# Patient Record
Sex: FEMALE | Race: WHITE | HISPANIC OR LATINO | Employment: FULL TIME | URBAN - METROPOLITAN AREA
[De-identification: names, ages, dates, MRNs, and addresses within clinical notes are randomized per-mention and may not be internally consistent; named-entity substitution may affect disease eponyms.]

---

## 2024-09-11 ENCOUNTER — PHARMACY VISIT (OUTPATIENT)
Dept: PHARMACY | Facility: MEDICAL CENTER | Age: 44
End: 2024-09-11
Payer: COMMERCIAL

## 2024-09-11 ENCOUNTER — HOSPITAL ENCOUNTER (EMERGENCY)
Facility: MEDICAL CENTER | Age: 44
End: 2024-09-11
Attending: EMERGENCY MEDICINE

## 2024-09-11 ENCOUNTER — APPOINTMENT (OUTPATIENT)
Dept: RADIOLOGY | Facility: MEDICAL CENTER | Age: 44
End: 2024-09-11
Attending: EMERGENCY MEDICINE

## 2024-09-11 VITALS
HEART RATE: 78 BPM | RESPIRATION RATE: 16 BRPM | BODY MASS INDEX: 28.22 KG/M2 | SYSTOLIC BLOOD PRESSURE: 152 MMHG | HEIGHT: 59 IN | TEMPERATURE: 98.1 F | OXYGEN SATURATION: 98 % | WEIGHT: 140 LBS | DIASTOLIC BLOOD PRESSURE: 92 MMHG

## 2024-09-11 DIAGNOSIS — K80.20 CALCULUS OF GALLBLADDER WITHOUT CHOLECYSTITIS WITHOUT OBSTRUCTION: ICD-10-CM

## 2024-09-11 LAB
ALBUMIN SERPL BCP-MCNC: 4.7 G/DL (ref 3.2–4.9)
ALBUMIN/GLOB SERPL: 1.2 G/DL
ALP SERPL-CCNC: 96 U/L (ref 30–99)
ALT SERPL-CCNC: 12 U/L (ref 2–50)
ANION GAP SERPL CALC-SCNC: 13 MMOL/L (ref 7–16)
APPEARANCE UR: CLEAR
AST SERPL-CCNC: 26 U/L (ref 12–45)
BACTERIA #/AREA URNS HPF: ABNORMAL /HPF
BASOPHILS # BLD AUTO: 0.3 % (ref 0–1.8)
BASOPHILS # BLD: 0.02 K/UL (ref 0–0.12)
BILIRUB SERPL-MCNC: 0.2 MG/DL (ref 0.1–1.5)
BILIRUB UR QL STRIP.AUTO: NEGATIVE
BUN SERPL-MCNC: 13 MG/DL (ref 8–22)
CALCIUM ALBUM COR SERPL-MCNC: 8.7 MG/DL (ref 8.5–10.5)
CALCIUM SERPL-MCNC: 9.3 MG/DL (ref 8.5–10.5)
CHLORIDE SERPL-SCNC: 103 MMOL/L (ref 96–112)
CO2 SERPL-SCNC: 26 MMOL/L (ref 20–33)
COLOR UR: YELLOW
CREAT SERPL-MCNC: 0.72 MG/DL (ref 0.5–1.4)
EKG IMPRESSION: NORMAL
EOSINOPHIL # BLD AUTO: 0.02 K/UL (ref 0–0.51)
EOSINOPHIL NFR BLD: 0.3 % (ref 0–6.9)
EPI CELLS #/AREA URNS HPF: ABNORMAL /HPF
ERYTHROCYTE [DISTWIDTH] IN BLOOD BY AUTOMATED COUNT: 41.1 FL (ref 35.9–50)
GFR SERPLBLD CREATININE-BSD FMLA CKD-EPI: 106 ML/MIN/1.73 M 2
GLOBULIN SER CALC-MCNC: 3.9 G/DL (ref 1.9–3.5)
GLUCOSE SERPL-MCNC: 136 MG/DL (ref 65–99)
GLUCOSE UR STRIP.AUTO-MCNC: NEGATIVE MG/DL
HCG SERPL QL: NEGATIVE
HCT VFR BLD AUTO: 47 % (ref 37–47)
HGB BLD-MCNC: 15.3 G/DL (ref 12–16)
HYALINE CASTS #/AREA URNS LPF: ABNORMAL /LPF
IMM GRANULOCYTES # BLD AUTO: 0.01 K/UL (ref 0–0.11)
IMM GRANULOCYTES NFR BLD AUTO: 0.1 % (ref 0–0.9)
KETONES UR STRIP.AUTO-MCNC: NEGATIVE MG/DL
LEUKOCYTE ESTERASE UR QL STRIP.AUTO: ABNORMAL
LIPASE SERPL-CCNC: 23 U/L (ref 11–82)
LYMPHOCYTES # BLD AUTO: 0.89 K/UL (ref 1–4.8)
LYMPHOCYTES NFR BLD: 11.8 % (ref 22–41)
MCH RBC QN AUTO: 27.8 PG (ref 27–33)
MCHC RBC AUTO-ENTMCNC: 32.6 G/DL (ref 32.2–35.5)
MCV RBC AUTO: 85.5 FL (ref 81.4–97.8)
MICRO URNS: ABNORMAL
MONOCYTES # BLD AUTO: 0.11 K/UL (ref 0–0.85)
MONOCYTES NFR BLD AUTO: 1.5 % (ref 0–13.4)
NEUTROPHILS # BLD AUTO: 6.47 K/UL (ref 1.82–7.42)
NEUTROPHILS NFR BLD: 86 % (ref 44–72)
NITRITE UR QL STRIP.AUTO: POSITIVE
NRBC # BLD AUTO: 0 K/UL
NRBC BLD-RTO: 0 /100 WBC (ref 0–0.2)
PH UR STRIP.AUTO: 7.5 [PH] (ref 5–8)
PLATELET # BLD AUTO: 315 K/UL (ref 164–446)
PMV BLD AUTO: 11 FL (ref 9–12.9)
POTASSIUM SERPL-SCNC: 4.3 MMOL/L (ref 3.6–5.5)
PROT SERPL-MCNC: 8.6 G/DL (ref 6–8.2)
PROT UR QL STRIP: 30 MG/DL
RBC # BLD AUTO: 5.5 M/UL (ref 4.2–5.4)
RBC # URNS HPF: ABNORMAL /HPF
RBC UR QL AUTO: NEGATIVE
SODIUM SERPL-SCNC: 142 MMOL/L (ref 135–145)
SP GR UR STRIP.AUTO: 1.02
UROBILINOGEN UR STRIP.AUTO-MCNC: 0.2 MG/DL
WBC # BLD AUTO: 7.5 K/UL (ref 4.8–10.8)
WBC #/AREA URNS HPF: ABNORMAL /HPF

## 2024-09-11 PROCEDURE — 700105 HCHG RX REV CODE 258: Performed by: EMERGENCY MEDICINE

## 2024-09-11 PROCEDURE — 36415 COLL VENOUS BLD VENIPUNCTURE: CPT

## 2024-09-11 PROCEDURE — 80053 COMPREHEN METABOLIC PANEL: CPT

## 2024-09-11 PROCEDURE — 93005 ELECTROCARDIOGRAM TRACING: CPT | Performed by: EMERGENCY MEDICINE

## 2024-09-11 PROCEDURE — 76705 ECHO EXAM OF ABDOMEN: CPT

## 2024-09-11 PROCEDURE — 85025 COMPLETE CBC W/AUTO DIFF WBC: CPT

## 2024-09-11 PROCEDURE — 83690 ASSAY OF LIPASE: CPT

## 2024-09-11 PROCEDURE — RXMED WILLOW AMBULATORY MEDICATION CHARGE: Performed by: EMERGENCY MEDICINE

## 2024-09-11 PROCEDURE — 96374 THER/PROPH/DIAG INJ IV PUSH: CPT

## 2024-09-11 PROCEDURE — 93005 ELECTROCARDIOGRAM TRACING: CPT

## 2024-09-11 PROCEDURE — 96375 TX/PRO/DX INJ NEW DRUG ADDON: CPT

## 2024-09-11 PROCEDURE — 84703 CHORIONIC GONADOTROPIN ASSAY: CPT

## 2024-09-11 PROCEDURE — 700111 HCHG RX REV CODE 636 W/ 250 OVERRIDE (IP): Mod: JZ | Performed by: EMERGENCY MEDICINE

## 2024-09-11 PROCEDURE — 81001 URINALYSIS AUTO W/SCOPE: CPT

## 2024-09-11 PROCEDURE — 99285 EMERGENCY DEPT VISIT HI MDM: CPT

## 2024-09-11 RX ORDER — ONDANSETRON 2 MG/ML
4 INJECTION INTRAMUSCULAR; INTRAVENOUS ONCE
Status: COMPLETED | OUTPATIENT
Start: 2024-09-11 | End: 2024-09-11

## 2024-09-11 RX ORDER — MORPHINE SULFATE 4 MG/ML
4 INJECTION INTRAVENOUS ONCE
Status: COMPLETED | OUTPATIENT
Start: 2024-09-11 | End: 2024-09-11

## 2024-09-11 RX ORDER — ONDANSETRON 4 MG/1
4 TABLET, ORALLY DISINTEGRATING ORAL EVERY 6 HOURS PRN
Qty: 10 TABLET | Refills: 0 | Status: SHIPPED | OUTPATIENT
Start: 2024-09-11

## 2024-09-11 RX ORDER — SODIUM CHLORIDE 9 MG/ML
1000 INJECTION, SOLUTION INTRAVENOUS ONCE
Status: COMPLETED | OUTPATIENT
Start: 2024-09-11 | End: 2024-09-11

## 2024-09-11 RX ORDER — OXYCODONE HYDROCHLORIDE 5 MG/1
5 TABLET ORAL EVERY 4 HOURS PRN
Qty: 12 TABLET | Refills: 0 | Status: SHIPPED | OUTPATIENT
Start: 2024-09-11 | End: 2024-09-16

## 2024-09-11 RX ADMIN — MORPHINE SULFATE 4 MG: 4 INJECTION, SOLUTION INTRAMUSCULAR; INTRAVENOUS at 14:56

## 2024-09-11 RX ADMIN — SODIUM CHLORIDE 1000 ML: 9 INJECTION, SOLUTION INTRAVENOUS at 14:55

## 2024-09-11 RX ADMIN — ONDANSETRON 4 MG: 2 INJECTION INTRAMUSCULAR; INTRAVENOUS at 14:56

## 2024-09-11 ASSESSMENT — LIFESTYLE VARIABLES: DO YOU DRINK ALCOHOL: NO

## 2024-09-11 NOTE — ED TRIAGE NOTES
Chief Complaint   Patient presents with    Abdominal Pain     Generalized abdominal pain x8 hours. Intermittent 8/10 pain. Hx of gallbladder stones.     N/V     X1 day. Denies blood in emesis.       Patient wheeled to triage for above complaint. Patient A&Ox4, GCS 15, patient speaking in full sentences. Equal and unlabored respirations. Patient educated on triage process and encouraged to notify staff if condition worsens. Appropriate protocols ordered. Patient returned to the lobby in stable condition.

## 2024-09-11 NOTE — ED PROVIDER NOTES
"ED Provider Note    CHIEF COMPLAINT  Chief Complaint   Patient presents with    Abdominal Pain     Generalized abdominal pain x8 hours. Intermittent 8/10 pain. Hx of gallbladder stones.     N/V     X1 day. Denies blood in emesis.      EXTERNAL RECORDS REVIEWED  Reviewed, none available.     HPI/ROS  LIMITATION TO HISTORY   Select: : None  OUTSIDE HISTORIAN(S):  Family at bedside.    Katina Rider is a 43 y.o. female who presents to the Emergency Department with abdominal pain onset earlier today. She locates the pain across her upper abdomen. The patient reports associated nausea and vomiting. She has a history of two gall stones. The patient has had intermittent symptoms for the past two years but this episode has been worse and longer lasting than normal. She denies any difficulty with voiding. She has a history of cervical cancer that was excised in [facility] in 2018.     PAST MEDICAL HISTORY  History reviewed. No pertinent past medical history.     SURGICAL HISTORY  History reviewed. No pertinent surgical history.     FAMILY HISTORY  History reviewed. No pertinent family history.    SOCIAL HISTORY   reports that she has never smoked. She has never used smokeless tobacco. She reports that she does not drink alcohol and does not use drugs.    CURRENT MEDICATIONS  Discharge Medication List as of 9/11/2024  4:55 PM          ALLERGIES  Patient has no known allergies.    PHYSICAL EXAM  BP (!) 145/106   Pulse 85   Resp 16   Ht 1.5 m (4' 11.06\")   Wt 63.5 kg (140 lb)   SpO2 100%      Constitutional: Nontoxic appearing. Alert in no apparent distress.  HENT: Normocephalic, Atraumatic. Bilateral external ears normal. Nose normal.  Moist mucous membranes.  Oropharynx clear.  Eyes: Pupils are equal and reactive. Conjunctiva normal.   Neck: Supple, full range of motion  Heart: Regular rate and rhythm.  No murmurs.    Lungs: No respiratory distress, normal work of breathing. Lungs clear to auscultation " bilaterally.  Abdomen Soft, no distention.  Diffuse upper abdominal tenderness, negative Arguelles's sign    Musculoskeletal: Atraumatic. No obvious deformities noted.  No lower extremity edema.  Skin: Warm, Dry.  No erythema, No rash.   Neurologic: Alert and oriented x3. Moving all extremities spontaneously without focal deficits.  Psychiatric: Affect normal, Mood normal, Appears appropriate and not intoxicated.     DIAGNOSTIC STUDIES / PROCEDURES    EKG  I have independently interpreted this EKG  Results for orders placed or performed during the hospital encounter of 24   EKG   Result Value Ref Range    Report       [facility] Emergency Dept.    Test Date:  2024  Pt Name:    YONATHAN HARO             Department: ER  MRN:        [id number]                      Room:  Gender:     Female                       Technician: [id number]  :        1980                   Requested By:ER TRIAGE PROTOCOL  Order #:    [id number]                    Reading MD: Kanika Irwin MD    Measurements  Intervals                                Axis  Rate:       95                           P:          31  ME:         128                          QRS:        -27  QRSD:       106                          T:          0  QT:         375  QTc:        472    Interpretive Statements  Sinus rhythm  Low voltage, precordial leads  RSR' in V1 or V2, right VCD or RVH  Left ventricular hypertrophy  Borderline T abnormalities, anterior leads  No previous ECG available for comparison  Electronically Signed On 2024 16:23:54 PDT by Kanika Irwin MD         LABS  Labs Reviewed   CBC WITH DIFFERENTIAL - Abnormal; Notable for the following components:       Result Value    RBC 5.50 (*)     Neutrophils-Polys 86.00 (*)     Lymphocytes 11.80 (*)     Lymphs (Absolute) 0.89 (*)     All other components within normal limits   COMP METABOLIC PANEL - Abnormal; Notable for the following components:    Glucose 136 (*)     Total  Protein 8.6 (*)     Globulin 3.9 (*)     All other components within normal limits   URINALYSIS,CULTURE IF INDICATED - Abnormal; Notable for the following components:    Protein 30 (*)     Nitrite Positive (*)     Leukocyte Esterase Trace (*)     All other components within normal limits   URINE MICROSCOPIC (W/UA) - Abnormal; Notable for the following components:    Bacteria Many (*)     All other components within normal limits   LIPASE   HCG QUAL SERUM   ESTIMATED GFR         RADIOLOGY  I have independently interpreted the diagnostic imaging associated with this visit and am waiting the final reading from the radiologist.   My preliminary interpretation is as follows: cholelithiasis    Radiologist interpretation:  US-RUQ   Final Result      Cholelithiasis            COURSE & MEDICAL DECISION MAKING    2:37 PM - Patient seen and examined at bedside. Discussed plan of care, including diagnostic workup. Patient agrees to the plan of care. The patient will be resuscitated with 1L NS IV and medicated with morphine 4 mg and Zofran 4 mg. Ordered for CBC w/ diff, CMP, Lipase, HCG Qual, EKG, US-RUQ, and Urinalysis to evaluate her symptoms.         ASSESSMENT, COURSE AND PLAN  Care Narrative: Middle-aged female with known history of gallstones who presents with an episode of upper abdominal pain and vomiting today with history of the same.  She is uncomfortable appearing, hypertensive on arrival with otherwise reassuring vital signs.  EKG is not concerning for acute ischemia and no symptoms concerning for ACS.  I did consider further imaging however her abdominal exam is not concerning for surgical process such as appendicitis, diverticulitis, bowel obstruction or perforation.  Her labs are reassuring without leukocytosis, transaminitis, elevated lipase concerning for pancreatitis.  ReSound shows evidence of cholelithiasis without cholecystitis or evidence of biliary obstruction.  Will plan to treat symptomatically followed  by reassessment.    5:00PM Upon reassessment, patient is resting comfortably with stable vital signs.  No new complaints at this time.  Discussed results with patient and/or family as well as importance of primary care/general surgery follow up.  Patient is undocumented and does not have insurance therefore registration to provide resources.  Patient understands plan of care and strict return precautions for new or changing symptoms.    ADDITIONAL PROBLEM LIST  Problem #1: Cholelithiasis - workup otherwise reassuring, treated symptomatically with improvement, general surgery referral placed however patient is undocumented and does not have insurance therefore this will likely be difficult for her, given short course of pain medication and nausea medication for recurrent symptoms and instructions on return precautions    Problem #2: Hypertension - discussed need for outpatient follow-up      DISPOSITION AND DISCUSSIONS  Escalation of care considered, and ultimately not performed:acute inpatient care management, however at this time, the patient is most appropriate for outpatient management    Barriers to care at this time, including but not limited to: Patient does not have established PCP, Patient does not have insurance, and Patient lacks financial resources.         DISPOSITION:  Patient will be discharged home in stable condition.    FOLLOW UP:  Rosalinda Hernández M.D.  [address] [place] [id number] [phone]    Schedule an appointment as soon as possible for a visit   general surgery follow up    [facility], Emergency Dept  [address] [facility] [id number] [phone]    If symptoms worsen      OUTPATIENT MEDICATIONS:  Discharge Medication List as of 9/11/2024  4:55 PM        START taking these medications    Details   ondansetron (ZOFRAN ODT) 4 MG TABLET DISPERSIBLE Dissolve 1 Tablet by mouth every 6 hours as needed for Nausea/Vomiting., Disp-10 Tablet, R-0, Normal       oxyCODONE immediate-release (ROXICODONE) 5 MG Tab Take 1 Tablet by mouth every four hours as needed for Severe Pain for up to 5 days., Disp-12 Tablet, R-0, Normal             FINAL DIAGNOSIS  1. Calculus of gallbladder without cholecystitis without obstruction      In prescribing controlled substances to this patient, I certify that I have obtained and reviewed the medical history of Katina Rider. I have also made a good concetta effort to obtain applicable records from other providers who have treated the patient and records did not demonstrate any increased risk of substance abuse that would prevent me from prescribing controlled substances.     I have conducted a physical exam and documented it. I have reviewed Ms. Rider’s prescription history as maintained by the [place] Prescription Monitoring Program.     I have assessed the patient’s risk for abuse, dependency, and addiction using the validated Opioid Risk Tool available at https://www.mdcalc.com/owyini-uajv-hnym-ort-narcotic-abuse.     Given the above, I believe the benefits of controlled substance therapy outweigh the risks. The reasons for prescribing controlled substances include non-narcotic, oral analgesic alternatives have been inadequate for pain control. Accordingly, I have discussed the risk and benefits, treatment plan, and alternative therapies with the patient.       The note accurately reflects work and decisions made by me.  Kanika Irwin M.D.  9/11/2024  6:05 PM     Janae ARNOLD (Rachael), am scribing for, and in the presence of, Kanika Irwin M.D..    Electronically signed by: Janae Cleaning (Rachael), 9/11/2024    Kanika ARNOLD M.D. personally performed the services described in this documentation, as scribed by Janae Cleaning in my presence, and it is both accurate and complete.

## 2024-09-11 NOTE — DISCHARGE INSTRUCTIONS
You were seen in the Emergency Department for abdominal pain and vomiting due to gallstones.    Labs were completed without significant acute abnormalities.  Ultrasound showed gallstones however no evidence of infection or obstruction which would necessitate emergent surgery.    Please use 1,000mg of tylenol or 600mg of ibuprofen every 6 hours as needed for pain.  Take nausea and pain medications as prescribed.  Eat a bland diet avoiding fatty or greasy foods.    Please attempt to obtain insurance and call for general surgery follow-up as you likely do need surgery to get your gallbladder out.    Return to the Emergency Department with uncontrolled pain or vomiting, fevers, or other concerns.

## 2024-09-12 NOTE — ED NOTES
Discharge instructions given.  All questions answered.  Prescriptions given x2, consent for controlled substances signed and in chart.  Pt to follow-up with general surgery, return to ER if symptoms worsen as discussed.  Pt verbalized understanding.  All belongings with pt.  Pt ambulated to lobby with friend.

## 2024-11-01 ENCOUNTER — HOSPITAL ENCOUNTER (EMERGENCY)
Facility: MEDICAL CENTER | Age: 44
End: 2024-11-01
Attending: STUDENT IN AN ORGANIZED HEALTH CARE EDUCATION/TRAINING PROGRAM

## 2024-11-01 ENCOUNTER — APPOINTMENT (OUTPATIENT)
Dept: RADIOLOGY | Facility: MEDICAL CENTER | Age: 44
End: 2024-11-01
Attending: STUDENT IN AN ORGANIZED HEALTH CARE EDUCATION/TRAINING PROGRAM

## 2024-11-01 VITALS
HEART RATE: 78 BPM | DIASTOLIC BLOOD PRESSURE: 70 MMHG | WEIGHT: 140 LBS | HEIGHT: 64 IN | SYSTOLIC BLOOD PRESSURE: 116 MMHG | OXYGEN SATURATION: 95 % | BODY MASS INDEX: 23.9 KG/M2 | TEMPERATURE: 97.6 F | RESPIRATION RATE: 18 BRPM

## 2024-11-01 DIAGNOSIS — N30.00 ACUTE CYSTITIS WITHOUT HEMATURIA: ICD-10-CM

## 2024-11-01 DIAGNOSIS — K80.80 BILIARY CALCULUS OF OTHER SITE WITHOUT OBSTRUCTION: ICD-10-CM

## 2024-11-01 LAB
ALBUMIN SERPL BCP-MCNC: 4.9 G/DL (ref 3.2–4.9)
ALBUMIN/GLOB SERPL: 1.4 G/DL
ALP SERPL-CCNC: 108 U/L (ref 30–99)
ALT SERPL-CCNC: 15 U/L (ref 2–50)
ANION GAP SERPL CALC-SCNC: 19 MMOL/L (ref 7–16)
APPEARANCE UR: ABNORMAL
AST SERPL-CCNC: 17 U/L (ref 12–45)
BACTERIA #/AREA URNS HPF: ABNORMAL /HPF
BASOPHILS # BLD AUTO: 0.5 % (ref 0–1.8)
BASOPHILS # BLD: 0.04 K/UL (ref 0–0.12)
BILIRUB SERPL-MCNC: 0.3 MG/DL (ref 0.1–1.5)
BILIRUB UR QL STRIP.AUTO: NEGATIVE
BUN SERPL-MCNC: 14 MG/DL (ref 8–22)
CALCIUM ALBUM COR SERPL-MCNC: 9.5 MG/DL (ref 8.5–10.5)
CALCIUM SERPL-MCNC: 10.2 MG/DL (ref 8.5–10.5)
CASTS URNS QL MICRO: ABNORMAL /LPF (ref 0–2)
CHLORIDE SERPL-SCNC: 97 MMOL/L (ref 96–112)
CO2 SERPL-SCNC: 24 MMOL/L (ref 20–33)
COLOR UR: YELLOW
CREAT SERPL-MCNC: 0.72 MG/DL (ref 0.5–1.4)
EOSINOPHIL # BLD AUTO: 0.03 K/UL (ref 0–0.51)
EOSINOPHIL NFR BLD: 0.4 % (ref 0–6.9)
EPITHELIAL CELLS 1715: ABNORMAL /HPF (ref 0–5)
ERYTHROCYTE [DISTWIDTH] IN BLOOD BY AUTOMATED COUNT: 40.2 FL (ref 35.9–50)
GFR SERPLBLD CREATININE-BSD FMLA CKD-EPI: 106 ML/MIN/1.73 M 2
GLOBULIN SER CALC-MCNC: 3.6 G/DL (ref 1.9–3.5)
GLUCOSE SERPL-MCNC: 165 MG/DL (ref 65–99)
GLUCOSE UR STRIP.AUTO-MCNC: NEGATIVE MG/DL
HCG SERPL QL: NEGATIVE
HCT VFR BLD AUTO: 45.6 % (ref 37–47)
HGB BLD-MCNC: 15.6 G/DL (ref 12–16)
IMM GRANULOCYTES # BLD AUTO: 0.01 K/UL (ref 0–0.11)
IMM GRANULOCYTES NFR BLD AUTO: 0.1 % (ref 0–0.9)
KETONES UR STRIP.AUTO-MCNC: 15 MG/DL
LEUKOCYTE ESTERASE UR QL STRIP.AUTO: ABNORMAL
LIPASE SERPL-CCNC: 20 U/L (ref 11–82)
LYMPHOCYTES # BLD AUTO: 2.09 K/UL (ref 1–4.8)
LYMPHOCYTES NFR BLD: 26.5 % (ref 22–41)
MCH RBC QN AUTO: 28.5 PG (ref 27–33)
MCHC RBC AUTO-ENTMCNC: 34.2 G/DL (ref 32.2–35.5)
MCV RBC AUTO: 83.4 FL (ref 81.4–97.8)
MICRO URNS: ABNORMAL
MONOCYTES # BLD AUTO: 0.36 K/UL (ref 0–0.85)
MONOCYTES NFR BLD AUTO: 4.6 % (ref 0–13.4)
NEUTROPHILS # BLD AUTO: 5.37 K/UL (ref 1.82–7.42)
NEUTROPHILS NFR BLD: 67.9 % (ref 44–72)
NITRITE UR QL STRIP.AUTO: POSITIVE
NRBC # BLD AUTO: 0 K/UL
NRBC BLD-RTO: 0 /100 WBC (ref 0–0.2)
PH UR STRIP.AUTO: 6.5 [PH] (ref 5–8)
PLATELET # BLD AUTO: 356 K/UL (ref 164–446)
PMV BLD AUTO: 10.9 FL (ref 9–12.9)
POTASSIUM SERPL-SCNC: 4.1 MMOL/L (ref 3.6–5.5)
PROT SERPL-MCNC: 8.5 G/DL (ref 6–8.2)
PROT UR QL STRIP: 30 MG/DL
RBC # BLD AUTO: 5.47 M/UL (ref 4.2–5.4)
RBC # URNS HPF: ABNORMAL /HPF (ref 0–2)
RBC UR QL AUTO: ABNORMAL
SODIUM SERPL-SCNC: 140 MMOL/L (ref 135–145)
SP GR UR STRIP.AUTO: 1.03
UROBILINOGEN UR STRIP.AUTO-MCNC: 1 EU/DL
WBC # BLD AUTO: 7.9 K/UL (ref 4.8–10.8)
WBC #/AREA URNS HPF: ABNORMAL /HPF

## 2024-11-01 PROCEDURE — 87086 URINE CULTURE/COLONY COUNT: CPT

## 2024-11-01 PROCEDURE — 36415 COLL VENOUS BLD VENIPUNCTURE: CPT

## 2024-11-01 PROCEDURE — 87077 CULTURE AEROBIC IDENTIFY: CPT

## 2024-11-01 PROCEDURE — 81015 MICROSCOPIC EXAM OF URINE: CPT

## 2024-11-01 PROCEDURE — 700111 HCHG RX REV CODE 636 W/ 250 OVERRIDE (IP): Mod: JZ | Performed by: STUDENT IN AN ORGANIZED HEALTH CARE EDUCATION/TRAINING PROGRAM

## 2024-11-01 PROCEDURE — 80053 COMPREHEN METABOLIC PANEL: CPT

## 2024-11-01 PROCEDURE — 76705 ECHO EXAM OF ABDOMEN: CPT

## 2024-11-01 PROCEDURE — 81003 URINALYSIS AUTO W/O SCOPE: CPT

## 2024-11-01 PROCEDURE — 83690 ASSAY OF LIPASE: CPT

## 2024-11-01 PROCEDURE — 96374 THER/PROPH/DIAG INJ IV PUSH: CPT

## 2024-11-01 PROCEDURE — 81001 URINALYSIS AUTO W/SCOPE: CPT

## 2024-11-01 PROCEDURE — 84703 CHORIONIC GONADOTROPIN ASSAY: CPT

## 2024-11-01 PROCEDURE — 99285 EMERGENCY DEPT VISIT HI MDM: CPT

## 2024-11-01 PROCEDURE — 87186 SC STD MICRODIL/AGAR DIL: CPT

## 2024-11-01 PROCEDURE — 96375 TX/PRO/DX INJ NEW DRUG ADDON: CPT

## 2024-11-01 PROCEDURE — 85025 COMPLETE CBC W/AUTO DIFF WBC: CPT

## 2024-11-01 RX ORDER — MORPHINE SULFATE 4 MG/ML
4 INJECTION INTRAVENOUS ONCE
Status: COMPLETED | OUTPATIENT
Start: 2024-11-01 | End: 2024-11-01

## 2024-11-01 RX ORDER — DICYCLOMINE HCL 20 MG
20 TABLET ORAL EVERY 6 HOURS
Qty: 120 TABLET | Refills: 0 | Status: SHIPPED | OUTPATIENT
Start: 2024-11-01 | End: 2025-01-02

## 2024-11-01 RX ORDER — NITROFURANTOIN 25; 75 MG/1; MG/1
100 CAPSULE ORAL 2 TIMES DAILY
Qty: 10 CAPSULE | Refills: 0 | Status: ACTIVE | OUTPATIENT
Start: 2024-11-01 | End: 2024-11-06

## 2024-11-01 RX ORDER — ONDANSETRON 2 MG/ML
4 INJECTION INTRAMUSCULAR; INTRAVENOUS ONCE
Status: COMPLETED | OUTPATIENT
Start: 2024-11-01 | End: 2024-11-01

## 2024-11-01 RX ORDER — ONDANSETRON 4 MG/1
4 TABLET, ORALLY DISINTEGRATING ORAL EVERY 6 HOURS PRN
Qty: 10 TABLET | Refills: 0 | Status: SHIPPED | OUTPATIENT
Start: 2024-11-01

## 2024-11-01 RX ADMIN — ONDANSETRON 4 MG: 2 INJECTION INTRAMUSCULAR; INTRAVENOUS at 20:47

## 2024-11-01 RX ADMIN — MORPHINE SULFATE 4 MG: 4 INJECTION INTRAVENOUS at 20:47

## 2024-11-01 ASSESSMENT — PAIN DESCRIPTION - PAIN TYPE: TYPE: ACUTE PAIN

## 2024-11-01 ASSESSMENT — FIBROSIS 4 INDEX: FIB4 SCORE: 1.05

## 2024-11-01 NOTE — LETTER
11/6/2024               Katina Rider  [address] [place] [id number]        Dear Katina (MR#[id number])    This letter is sent in regards to your recent visit to the [facility] Emergency Department on 11/1/2024. During the visit, tests were performed to assist the physician in your medical diagnosis. A review of your tests requires that we notify you of the following:    Your urine culture was POSITIVE for a bacteria called Escherichia coli. The antibiotic prescribed for you (nitrofurantoin) should be active to treat this bacteria. It is important that you continue taking your antibiotic until it is finished.     Please feel free to contact me at the number below if you have any questions or concerns. Thank you for your cooperation in the matter.    Sincerely,  ED Culture Follow-Up Staff  Dorcas Newman, PharmD    [facility], Emergency Department  [address] [place] [id number] [phone]

## 2024-11-02 NOTE — ED NOTES
Discharged in stable condition, alert and oriented, ambulatory. Accompanied by family.  Prescribed medication and follow up appointment instructed. Health education imparted. Instructed to come back once symptoms worsened. Pt verbalized understanding of the information given. Removed IV line and applied pressure.     Provided information above with .

## 2024-11-02 NOTE — ED TRIAGE NOTES
Katina Rider  44 y.o. female  Chief Complaint   Patient presents with    Abdominal Pain     Seen on the 9/11 for gall stones. Comes back due severe abdominal pain which started again today. Vomiting the whole day.  Actively vomiting in triage.      Pt on wheelchair to triage for above complaint.     Pt is GCS 15, speaking in full sentences, follows commands and responds appropriately to questions. Resp are even and unlabored.     Abdominal pain protocol ordered. Pt placed in phlebotomy. Pt educated on triage process. Pt encouraged to alert staff for any changes.       Vitals:    11/01/24 1931   BP: 134/87   Pulse: 85   Resp: 20   Temp: 36.4 °C (97.6 °F)   SpO2: 100%

## 2024-11-02 NOTE — ED PROVIDER NOTES
"ER Provider Note    Scribed for Phillip Hackett D.o. by Kaitlin Cespedes. 11/1/2024  8:36 PM    Primary Care Provider: Pcp Pt States None    CHIEF COMPLAINT   Chief Complaint   Patient presents with    Abdominal Pain     Seen on the 9/11 for gall stones. Comes back due severe abdominal pain which started again today. Vomiting the whole day.  Actively vomiting in triage.      EXTERNAL RECORDS REVIEWED  Patient was seen here on 9/11/24 for evaluation of abdominal pain with nausea and vomiting. She was diagnosed with calculus of gallbladder without cholecystitis without obstruction.    HPI/ROS  LIMITATION TO HISTORY   Select: Language Panamanian  OUTSIDE HISTORIAN(S):  Family is present at bedside.    Katina Rider is a 44 y.o. female who presents to the ED complaining of abdominal pain and vomiting onset this morning. She reports that there is some blood spots in her vomit.  Reports symptoms are similar to prior gallbladder problems seen here in the past.  Denies fever, chest pain, cough, or diarrhea. Patient states that she has had these symptoms before in September. Denies alcohol use. Denies recent surgeries.     PAST MEDICAL HISTORY  No past medical history noted.    SURGICAL HISTORY  No past surgical history noted.    FAMILY HISTORY  No family history noted.    SOCIAL HISTORY   reports that she has never smoked. She has never used smokeless tobacco. She reports that she does not drink alcohol and does not use drugs.    CURRENT MEDICATIONS  Previous Medications    ONDANSETRON (ZOFRAN ODT) 4 MG TABLET DISPERSIBLE    Dissolve 1 Tablet by mouth every 6 hours as needed for Nausea/Vomiting.       ALLERGIES  Patient has no known allergies.    PHYSICAL EXAM  /87   Pulse 85   Temp 36.4 °C (97.6 °F) (Temporal)   Resp 20   Ht 1.626 m (5' 4\")   Wt 63.5 kg (140 lb)   SpO2 100%   BMI 24.03 kg/m²   Pulse oximetry interpretation: I interpret the pulse oximetry as normal.  Constitutional: Awake and " alert. Mild distress.  Head: NCAT.  HEENT: Normal Conjunctiva. PERRLA.  Neck: Grossly normal range of motion. Airway midline.  Cardiovascular: Normal heart rate, Normal rhythm.  Thorax & Lungs: No respiratory distress. Clear to Auscultation bilaterally.  Abdomen: Normal inspection. right upper quadrant and epigastric tenderness.  No Arguelles sign.  Nondistended  Skin: No obvious rash.  Back: No tenderness, No CVA tenderness.   Musculoskeletal: No obvious deformity. Moves all extremities Well.  Neurologic: A&Ox3.   Psychiatric: Mood and affect are appropriate for situation.     DIAGNOSTIC STUDIES    EKG/LABS  Results for orders placed or performed during the hospital encounter of 11/01/24   CBC WITH DIFFERENTIAL    Collection Time: 11/01/24  8:02 PM   Result Value Ref Range    WBC 7.9 4.8 - 10.8 K/uL    RBC 5.47 (H) 4.20 - 5.40 M/uL    Hemoglobin 15.6 12.0 - 16.0 g/dL    Hematocrit 45.6 37.0 - 47.0 %    MCV 83.4 81.4 - 97.8 fL    MCH 28.5 27.0 - 33.0 pg    MCHC 34.2 32.2 - 35.5 g/dL    RDW 40.2 35.9 - 50.0 fL    Platelet Count 356 164 - 446 K/uL    MPV 10.9 9.0 - 12.9 fL    Neutrophils-Polys 67.90 44.00 - 72.00 %    Lymphocytes 26.50 22.00 - 41.00 %    Monocytes 4.60 0.00 - 13.40 %    Eosinophils 0.40 0.00 - 6.90 %    Basophils 0.50 0.00 - 1.80 %    Immature Granulocytes 0.10 0.00 - 0.90 %    Nucleated RBC 0.00 0.00 - 0.20 /100 WBC    Neutrophils (Absolute) 5.37 1.82 - 7.42 K/uL    Lymphs (Absolute) 2.09 1.00 - 4.80 K/uL    Monos (Absolute) 0.36 0.00 - 0.85 K/uL    Eos (Absolute) 0.03 0.00 - 0.51 K/uL    Baso (Absolute) 0.04 0.00 - 0.12 K/uL    Immature Granulocytes (abs) 0.01 0.00 - 0.11 K/uL    NRBC (Absolute) 0.00 K/uL   COMP METABOLIC PANEL    Collection Time: 11/01/24  8:02 PM   Result Value Ref Range    Sodium 140 135 - 145 mmol/L    Potassium 4.1 3.6 - 5.5 mmol/L    Chloride 97 96 - 112 mmol/L    Co2 24 20 - 33 mmol/L    Anion Gap 19.0 (H) 7.0 - 16.0    Glucose 165 (H) 65 - 99 mg/dL    Bun 14 8 - 22 mg/dL     Creatinine 0.72 0.50 - 1.40 mg/dL    Calcium 10.2 8.5 - 10.5 mg/dL    Correct Calcium 9.5 8.5 - 10.5 mg/dL    AST(SGOT) 17 12 - 45 U/L    ALT(SGPT) 15 2 - 50 U/L    Alkaline Phosphatase 108 (H) 30 - 99 U/L    Total Bilirubin 0.3 0.1 - 1.5 mg/dL    Albumin 4.9 3.2 - 4.9 g/dL    Total Protein 8.5 (H) 6.0 - 8.2 g/dL    Globulin 3.6 (H) 1.9 - 3.5 g/dL    A-G Ratio 1.4 g/dL   LIPASE    Collection Time: 11/01/24  8:02 PM   Result Value Ref Range    Lipase 20 11 - 82 U/L   HCG QUAL SERUM    Collection Time: 11/01/24  8:02 PM   Result Value Ref Range    Beta-Hcg Qualitative Serum Negative Negative   ESTIMATED GFR    Collection Time: 11/01/24  8:02 PM   Result Value Ref Range    GFR (CKD-EPI) 106 >60 mL/min/1.73 m 2   URINALYSIS,CULTURE IF INDICATED    Collection Time: 11/01/24  9:20 PM    Specimen: Urine   Result Value Ref Range    Color Yellow     Character Cloudy (A)     Specific Gravity 1.027 <1.035    Ph 6.5 5.0 - 8.0    Glucose Negative Negative mg/dL    Ketones 15 (A) Negative mg/dL    Protein 30 (A) Negative mg/dL    Bilirubin Negative Negative    Urobilinogen, Urine 1.0 <=1.0 EU/dL    Nitrite Positive (A) Negative    Leukocyte Esterase Small (A) Negative    Occult Blood Large (A) Negative    Micro Urine Req Microscopic    URINE MICROSCOPIC (W/UA)    Collection Time: 11/01/24  9:20 PM   Result Value Ref Range    WBC 21-50 (A) /hpf    RBC 21-50 (A) 0 - 2 /hpf    Bacteria Many (A) None /hpf    Epithelial Cells 0-2 0 - 5 /hpf    Urine Casts 3-5 (A) 0 - 2 /lpf      I have independently interpreted this EKG    RADIOLOGY/PROCEDURES   The attending emergency physician has independently interpreted the diagnostic imaging associated with this visit and am waiting the final reading from the radiologist.   My preliminary interpretation is a follows: Gallbladder stones    Radiologist interpretation:  US-RUQ   Final Result      1.  Cholelithiasis      2.  Gallbladder wall thickening and this could indicate cholecystitis in the  appropriate clinical setting      3.  No other significant finding          COURSE & MEDICAL DECISION MAKING     ASSESSMENT, COURSE AND PLAN  Care Narrative:   44-year-old female with history of cholelithiasis presents for abdominal pain and vomiting today without fevers  Afebrile normal vitals    8:43 PM - Patient was evaluated at bedside. Patient is a 44 y.o. female presenting with abdominal pain and vomiting with blood onset this morning. Exam reveals right upper quadrant and epigastric tenderness.  There is no rebound or guarding.  No Arguelles sign.  Ordered labs and imaging to further evaluate. Differentials include: cholelithiasis, cholecystitis, pancreatitis.    She is medicated with IV morphine and IV antiemetics.  Labs with no leukocytosis, mild anion gap elevation.  Her bilirubin and liver function tests are normal with exception of mild alkaline phosphatase elevation.  Lipase is also normal.  Her urine does return with infection which we will treat.    She is feeling much improved after interventions.  The right upper quadrant ultrasound does show cholelithiasis.  There is question of acute cholecystitis however with no leukocytosis, pain resolved and liver function and bilirubin are normal this is not likely the case.    11:24 PM - Patient was reevaluated at bedside.  Again, she is having no pain and is p.o. tolerant.  We will discharge her with antibiotics for UTI.  We will refer her for general surgery for evaluation of elective cholecystectomy. discussed the plan for discharge, patient is agreeable to the plan.       ADDITIONAL PROBLEM LIST    UTI    DISPOSITION AND DISCUSSIONS  I have discussed management of the patient with the following physicians and ISRAEL's:  None    Discussion of management with other QHP or appropriate source(s): None     Escalation of care considered, and ultimately not performed: Laboratory analysis, diagnostic imaging, and acute inpatient care management, however at this time,  the patient is most appropriate for outpatient management.    Barriers to care at this time, including but not limited to: Patient does not have established PCP.     Decision tools and prescription drugs considered including, but not limited to: Antibiotics for UTI .     The patient will return for new or worsening symptoms and is stable at the time of discharge.    The patient is referred to a primary physician for blood pressure management, diabetic screening, and for all other preventative health concerns.    DISPOSITION:  Patient will be discharged home in stable condition.    FOLLOW UP:  Kamar Mckeon M.D.  [address] [place] [id number]  [phone]    Schedule an appointment as soon as possible for a visit         OUTPATIENT MEDICATIONS:  Discharge Medication List as of 11/1/2024 11:32 PM        START taking these medications    Details   nitrofurantoin (MACROBID) 100 MG Cap Take 1 Capsule by mouth 2 times a day for 5 days., Disp-10 Capsule, R-0, Normal      dicyclomine (BENTYL) 20 MG Tab Take 1 Tablet by mouth every 6 hours., Disp-120 Tablet, R-0, Normal      ondansetron (ZOFRAN ODT) 4 MG TABLET DISPERSIBLE Take 1 Tablet by mouth every 6 hours as needed for Nausea/Vomiting., Disp-10 Tablet, R-0, Normal              FINAL DIANGOSIS  1. Biliary calculus of other site without obstruction    2. Acute cystitis without hematuria       Kaitlin ARNOLD), am scribing for, and in the presence of, Phillip Hackett D.O..    Electronically signed by: Kaitlin Banks), 11/1/2024    Phillip ARNOLD D.O. personally performed the services described in this documentation, as scribed by Kaitlin Cespedes in my presence, and it is both accurate and complete.      The note accurately reflects work and decisions made by me.  Phillip Hackett D.O.  11/2/2024  2:52 AM

## 2024-11-04 LAB
BACTERIA UR CULT: ABNORMAL
BACTERIA UR CULT: ABNORMAL
SIGNIFICANT IND 70042: ABNORMAL
SITE SITE: ABNORMAL
SOURCE SOURCE: ABNORMAL

## 2024-11-06 NOTE — ED NOTES
ED Positive Culture Follow-up/Notification Note:    Date: 11/6/2024     Patient seen in the ED on 11/1/2024 for abdominal pain. She reported bloody emesis and that her symptoms were similar to prior gallbladder problems in September 2024. She denied fever, chest pain, cough, diarrhea, alcohol use, recent surgeries. Physical exam was significant for right upper quadrant and epigastric tenderness. Serum beta-hcg was negative. RUQ ultrasound showed cholelithiasis and gallbladder wall thickening which could indicate cholecystitis.   1. Biliary calculus of other site without obstruction    2. Acute cystitis without hematuria       Discharge Medication List as of 11/1/2024 11:32 PM        START taking these medications    Details   nitrofurantoin (MACROBID) 100 MG Cap Take 1 Capsule by mouth 2 times a day for 5 days., Disp-10 Capsule, R-0, Normal      dicyclomine (BENTYL) 20 MG Tab Take 1 Tablet by mouth every 6 hours., Disp-120 Tablet, R-0, Normal      ondansetron (ZOFRAN ODT) 4 MG TABLET DISPERSIBLE Take 1 Tablet by mouth every 6 hours as needed for Nausea/Vomiting., Disp-10 Tablet, R-0, Normal           Allergies: Patient has no known allergies.     Vitals:    11/01/24 2101 11/01/24 2116 11/01/24 2301 11/01/24 2341   BP: 123/81 115/75 114/73 116/70   Pulse: 84 83 88 78   Resp: 18  18 18   Temp:       TempSrc:       SpO2: 91% 95% 94% 95%   Weight:       Height:         Final cultures:   Results       Procedure Component Value Units Date/Time    URINE CULTURE(NEW) [[id number]]  (Abnormal)  (Susceptibility) Collected: 11/01/24 2120    Order Status: Completed Specimen: Urine Updated: 11/04/24 0732     Significant Indicator POS     Source UR     Site -     Culture Result -      Escherichia coli  >100,000 cfu/mL      Susceptibility       Escherichia coli (1)       Antibiotic Interpretation Microscan   Method Status    Ampicillin/sulbactam Sensitive <=4/2 mcg/mL JOHN Final    Amikacin  [*]  Sensitive <=16 mcg/mL JOHN Final     Ampicillin Sensitive <=8 mcg/mL JOHN Final    Amoxicillin/CA Sensitive <=8/4 mcg/mL JOHN Final    Aztreonam  [*]  Sensitive <=4 mcg/mL JOHN Final    Ceftolozane+Tazobactam  [*]  Sensitive <=2 mcg/mL JOHN Final    Ceftriaxone Sensitive <=1 mcg/mL JOHN Final    Ceftazidime  [*]  Sensitive <=1 mcg/mL JOHN Final    Cefazolin Sensitive <=2 mcg/mL JOHN Final     Breakpoints when Cefazolin is used for therapy of infections  other than uncomplicated UTIs due to Enterobacterales are as  follows:  JOHN and Interpretation:  <=2 S  4 I  >=8 R         Ciprofloxacin Resistant >2 mcg/mL JOHN Final    Cefepime Sensitive <=2 mcg/mL JOHN Final    Cefuroxime Sensitive <=4 mcg/mL JOHN Final    Ceftazidime+Avibactam  [*]  Sensitive <=4 mcg/mL JOHN Final    Ertapenem  [*]  Sensitive <=0.5 mcg/mL JOHN Final    Nitrofurantoin Sensitive <=32 mcg/mL JOHN Final    Gentamicin Sensitive <=2 mcg/mL JOHN Final    Imipenem  [*]  Sensitive <=1 mcg/mL JOHN Final    Levofloxacin Resistant >4 mcg/mL JOHN Final    Meropenem Sensitive <=1 mcg/mL JOHN Final    Meropenem/Vaborbactam Sensitive <=2 mcg/mL JOHN Final    Minocycline Sensitive <=4 mcg/mL JOHN Final    Pip/Tazobactam Sensitive <=8 mcg/mL JOHN Final    Trimeth/Sulfa Resistant >2/38 mcg/mL JOHN Final    Tetracycline  [*]  Resistant >8 mcg/mL JOHN Final    Tigecycline Sensitive <=2 mcg/mL JOHN Final    Tobramycin Sensitive <=2 mcg/mL JOHN Final               [*]  Suppressed Antibiotic                   URINALYSIS,CULTURE IF INDICATED [[id number]]  (Abnormal) Collected: 11/01/24 2120    Order Status: Completed Specimen: Urine Updated: 11/01/24 2214     Color Yellow     Character Cloudy     Specific Gravity 1.027     Ph 6.5     Glucose Negative mg/dL      Ketones 15 mg/dL      Protein 30 mg/dL      Bilirubin Negative     Urobilinogen, Urine 1.0 EU/dL      Nitrite Positive     Leukocyte Esterase Small     Occult Blood Large     Micro Urine Req Microscopic            Plan:   Appropriate therapy prescribed for the E. Coli  isolated in the urine culture. No changes needed based on culture result.   Sent letter to patient to notify of culture result and encourage compliance with therapy.     Dorcas Newman, PharmD

## 2024-12-15 ENCOUNTER — APPOINTMENT (OUTPATIENT)
Dept: RADIOLOGY | Facility: MEDICAL CENTER | Age: 44
End: 2024-12-15
Attending: EMERGENCY MEDICINE

## 2024-12-15 ENCOUNTER — ANESTHESIA EVENT (OUTPATIENT)
Dept: SURGERY | Facility: MEDICAL CENTER | Age: 44
End: 2024-12-15

## 2024-12-15 ENCOUNTER — HOSPITAL ENCOUNTER (OUTPATIENT)
Facility: MEDICAL CENTER | Age: 44
End: 2024-12-16
Attending: EMERGENCY MEDICINE | Admitting: STUDENT IN AN ORGANIZED HEALTH CARE EDUCATION/TRAINING PROGRAM

## 2024-12-15 ENCOUNTER — ANESTHESIA (OUTPATIENT)
Dept: SURGERY | Facility: MEDICAL CENTER | Age: 44
End: 2024-12-15

## 2024-12-15 DIAGNOSIS — K81.9 CHOLECYSTITIS: ICD-10-CM

## 2024-12-15 DIAGNOSIS — G89.18 ACUTE POST-OPERATIVE PAIN: ICD-10-CM

## 2024-12-15 DIAGNOSIS — R11.0 NAUSEA: ICD-10-CM

## 2024-12-15 DIAGNOSIS — R10.11 RIGHT UPPER QUADRANT ABDOMINAL PAIN: ICD-10-CM

## 2024-12-15 PROBLEM — N30.00 ACUTE CYSTITIS: Status: ACTIVE | Noted: 2024-12-15

## 2024-12-15 PROBLEM — K81.0 ACUTE CHOLECYSTITIS: Status: ACTIVE | Noted: 2024-12-15

## 2024-12-15 PROBLEM — Z78.9 NO CONTRAINDICATION TO DEEP VEIN THROMBOSIS (DVT) PROPHYLAXIS: Status: ACTIVE | Noted: 2024-12-15

## 2024-12-15 LAB
ALBUMIN SERPL BCP-MCNC: 4.8 G/DL (ref 3.2–4.9)
ALBUMIN/GLOB SERPL: 1.3 G/DL
ALP SERPL-CCNC: 98 U/L (ref 30–99)
ALT SERPL-CCNC: 14 U/L (ref 2–50)
ANION GAP SERPL CALC-SCNC: 16 MMOL/L (ref 7–16)
APPEARANCE UR: ABNORMAL
AST SERPL-CCNC: 20 U/L (ref 12–45)
BACTERIA #/AREA URNS HPF: ABNORMAL /HPF
BASOPHILS # BLD AUTO: 0.3 % (ref 0–1.8)
BASOPHILS # BLD: 0.02 K/UL (ref 0–0.12)
BILIRUB SERPL-MCNC: 0.3 MG/DL (ref 0.1–1.5)
BILIRUB UR QL STRIP.AUTO: NEGATIVE
BUN SERPL-MCNC: 12 MG/DL (ref 8–22)
CALCIUM ALBUM COR SERPL-MCNC: 10 MG/DL (ref 8.5–10.5)
CALCIUM SERPL-MCNC: 10.6 MG/DL (ref 8.5–10.5)
CASTS URNS QL MICRO: ABNORMAL /LPF (ref 0–2)
CHLORIDE SERPL-SCNC: 102 MMOL/L (ref 96–112)
CO2 SERPL-SCNC: 23 MMOL/L (ref 20–33)
COLOR UR: YELLOW
CREAT SERPL-MCNC: 0.72 MG/DL (ref 0.5–1.4)
EOSINOPHIL # BLD AUTO: 0.01 K/UL (ref 0–0.51)
EOSINOPHIL NFR BLD: 0.2 % (ref 0–6.9)
EPITHELIAL CELLS 1715: ABNORMAL /HPF (ref 0–5)
ERYTHROCYTE [DISTWIDTH] IN BLOOD BY AUTOMATED COUNT: 41 FL (ref 35.9–50)
FLUAV RNA SPEC QL NAA+PROBE: NEGATIVE
FLUBV RNA SPEC QL NAA+PROBE: NEGATIVE
GFR SERPLBLD CREATININE-BSD FMLA CKD-EPI: 106 ML/MIN/1.73 M 2
GLOBULIN SER CALC-MCNC: 3.6 G/DL (ref 1.9–3.5)
GLUCOSE SERPL-MCNC: 181 MG/DL (ref 65–99)
GLUCOSE UR STRIP.AUTO-MCNC: NEGATIVE MG/DL
HCG SERPL QL: NEGATIVE
HCT VFR BLD AUTO: 45.4 % (ref 37–47)
HGB BLD-MCNC: 15.5 G/DL (ref 12–16)
IMM GRANULOCYTES # BLD AUTO: 0.02 K/UL (ref 0–0.11)
IMM GRANULOCYTES NFR BLD AUTO: 0.3 % (ref 0–0.9)
KETONES UR STRIP.AUTO-MCNC: 15 MG/DL
LEUKOCYTE ESTERASE UR QL STRIP.AUTO: ABNORMAL
LIPASE SERPL-CCNC: 57 U/L (ref 11–82)
LYMPHOCYTES # BLD AUTO: 1.38 K/UL (ref 1–4.8)
LYMPHOCYTES NFR BLD: 21.2 % (ref 22–41)
MCH RBC QN AUTO: 28.7 PG (ref 27–33)
MCHC RBC AUTO-ENTMCNC: 34.1 G/DL (ref 32.2–35.5)
MCV RBC AUTO: 83.9 FL (ref 81.4–97.8)
MICRO URNS: ABNORMAL
MONOCYTES # BLD AUTO: 0.13 K/UL (ref 0–0.85)
MONOCYTES NFR BLD AUTO: 2 % (ref 0–13.4)
NEUTROPHILS # BLD AUTO: 4.94 K/UL (ref 1.82–7.42)
NEUTROPHILS NFR BLD: 76 % (ref 44–72)
NITRITE UR QL STRIP.AUTO: NEGATIVE
NRBC # BLD AUTO: 0 K/UL
NRBC BLD-RTO: 0 /100 WBC (ref 0–0.2)
PH UR STRIP.AUTO: 8 [PH] (ref 5–8)
PLATELET # BLD AUTO: 300 K/UL (ref 164–446)
PMV BLD AUTO: 11.6 FL (ref 9–12.9)
POTASSIUM SERPL-SCNC: 4.4 MMOL/L (ref 3.6–5.5)
PROT SERPL-MCNC: 8.4 G/DL (ref 6–8.2)
PROT UR QL STRIP: 100 MG/DL
RBC # BLD AUTO: 5.41 M/UL (ref 4.2–5.4)
RBC # URNS HPF: ABNORMAL /HPF (ref 0–2)
RBC UR QL AUTO: NEGATIVE
RSV RNA SPEC QL NAA+PROBE: NEGATIVE
SARS-COV-2 RNA RESP QL NAA+PROBE: NOTDETECTED
SODIUM SERPL-SCNC: 141 MMOL/L (ref 135–145)
SP GR UR STRIP.AUTO: 1.03
SPECIMEN SOURCE: NORMAL
UROBILINOGEN UR STRIP.AUTO-MCNC: 1 EU/DL
WBC # BLD AUTO: 6.5 K/UL (ref 4.8–10.8)
WBC #/AREA URNS HPF: ABNORMAL /HPF

## 2024-12-15 PROCEDURE — 700117 HCHG RX CONTRAST REV CODE 255: Performed by: EMERGENCY MEDICINE

## 2024-12-15 PROCEDURE — 88304 TISSUE EXAM BY PATHOLOGIST: CPT

## 2024-12-15 PROCEDURE — 160035 HCHG PACU - 1ST 60 MINS PHASE I: Performed by: STUDENT IN AN ORGANIZED HEALTH CARE EDUCATION/TRAINING PROGRAM

## 2024-12-15 PROCEDURE — 36415 COLL VENOUS BLD VENIPUNCTURE: CPT

## 2024-12-15 PROCEDURE — 47562 LAPAROSCOPIC CHOLECYSTECTOMY: CPT | Performed by: STUDENT IN AN ORGANIZED HEALTH CARE EDUCATION/TRAINING PROGRAM

## 2024-12-15 PROCEDURE — 85025 COMPLETE CBC W/AUTO DIFF WBC: CPT

## 2024-12-15 PROCEDURE — 96375 TX/PRO/DX INJ NEW DRUG ADDON: CPT

## 2024-12-15 PROCEDURE — 74177 CT ABD & PELVIS W/CONTRAST: CPT

## 2024-12-15 PROCEDURE — 99291 CRITICAL CARE FIRST HOUR: CPT

## 2024-12-15 PROCEDURE — 700111 HCHG RX REV CODE 636 W/ 250 OVERRIDE (IP): Performed by: ANESTHESIOLOGY

## 2024-12-15 PROCEDURE — 80053 COMPREHEN METABOLIC PANEL: CPT

## 2024-12-15 PROCEDURE — A9270 NON-COVERED ITEM OR SERVICE: HCPCS | Performed by: PHYSICIAN ASSISTANT

## 2024-12-15 PROCEDURE — 87086 URINE CULTURE/COLONY COUNT: CPT

## 2024-12-15 PROCEDURE — 700101 HCHG RX REV CODE 250: Performed by: STUDENT IN AN ORGANIZED HEALTH CARE EDUCATION/TRAINING PROGRAM

## 2024-12-15 PROCEDURE — 700101 HCHG RX REV CODE 250: Performed by: ANESTHESIOLOGY

## 2024-12-15 PROCEDURE — 160029 HCHG SURGERY MINUTES - 1ST 30 MINS LEVEL 4: Performed by: STUDENT IN AN ORGANIZED HEALTH CARE EDUCATION/TRAINING PROGRAM

## 2024-12-15 PROCEDURE — A9270 NON-COVERED ITEM OR SERVICE: HCPCS | Performed by: ANESTHESIOLOGY

## 2024-12-15 PROCEDURE — 84703 CHORIONIC GONADOTROPIN ASSAY: CPT

## 2024-12-15 PROCEDURE — 160041 HCHG SURGERY MINUTES - EA ADDL 1 MIN LEVEL 4: Performed by: STUDENT IN AN ORGANIZED HEALTH CARE EDUCATION/TRAINING PROGRAM

## 2024-12-15 PROCEDURE — 0241U HCHG SARS-COV-2 COVID-19 NFCT DS RESP RNA 4 TRGT MIC: CPT

## 2024-12-15 PROCEDURE — 700102 HCHG RX REV CODE 250 W/ 637 OVERRIDE(OP): Performed by: ANESTHESIOLOGY

## 2024-12-15 PROCEDURE — 160009 HCHG ANES TIME/MIN: Performed by: STUDENT IN AN ORGANIZED HEALTH CARE EDUCATION/TRAINING PROGRAM

## 2024-12-15 PROCEDURE — 99222 1ST HOSP IP/OBS MODERATE 55: CPT | Performed by: STUDENT IN AN ORGANIZED HEALTH CARE EDUCATION/TRAINING PROGRAM

## 2024-12-15 PROCEDURE — 700111 HCHG RX REV CODE 636 W/ 250 OVERRIDE (IP): Mod: JZ | Performed by: ANESTHESIOLOGY

## 2024-12-15 PROCEDURE — 96365 THER/PROPH/DIAG IV INF INIT: CPT

## 2024-12-15 PROCEDURE — 83690 ASSAY OF LIPASE: CPT

## 2024-12-15 PROCEDURE — 700111 HCHG RX REV CODE 636 W/ 250 OVERRIDE (IP): Mod: JZ | Performed by: EMERGENCY MEDICINE

## 2024-12-15 PROCEDURE — 160002 HCHG RECOVERY MINUTES (STAT): Performed by: STUDENT IN AN ORGANIZED HEALTH CARE EDUCATION/TRAINING PROGRAM

## 2024-12-15 PROCEDURE — 700102 HCHG RX REV CODE 250 W/ 637 OVERRIDE(OP): Performed by: PHYSICIAN ASSISTANT

## 2024-12-15 PROCEDURE — 81001 URINALYSIS AUTO W/SCOPE: CPT

## 2024-12-15 PROCEDURE — 87186 SC STD MICRODIL/AGAR DIL: CPT

## 2024-12-15 PROCEDURE — 160048 HCHG OR STATISTICAL LEVEL 1-5: Performed by: STUDENT IN AN ORGANIZED HEALTH CARE EDUCATION/TRAINING PROGRAM

## 2024-12-15 PROCEDURE — 87077 CULTURE AEROBIC IDENTIFY: CPT | Mod: 91

## 2024-12-15 PROCEDURE — 700105 HCHG RX REV CODE 258: Performed by: ANESTHESIOLOGY

## 2024-12-15 PROCEDURE — 76705 ECHO EXAM OF ABDOMEN: CPT

## 2024-12-15 PROCEDURE — 47562 LAPAROSCOPIC CHOLECYSTECTOMY: CPT | Mod: AS | Performed by: PHYSICIAN ASSISTANT

## 2024-12-15 PROCEDURE — G0378 HOSPITAL OBSERVATION PER HR: HCPCS

## 2024-12-15 RX ORDER — HYDROMORPHONE HYDROCHLORIDE 1 MG/ML
0.2 INJECTION, SOLUTION INTRAMUSCULAR; INTRAVENOUS; SUBCUTANEOUS
Status: DISCONTINUED | OUTPATIENT
Start: 2024-12-15 | End: 2024-12-15 | Stop reason: HOSPADM

## 2024-12-15 RX ORDER — SCOLOPAMINE TRANSDERMAL SYSTEM 1 MG/1
1 PATCH, EXTENDED RELEASE TRANSDERMAL
Status: DISCONTINUED | OUTPATIENT
Start: 2024-12-15 | End: 2024-12-16 | Stop reason: HOSPADM

## 2024-12-15 RX ORDER — ACETAMINOPHEN 500 MG
1000 TABLET ORAL EVERY 6 HOURS PRN
Status: DISCONTINUED | OUTPATIENT
Start: 2024-12-20 | End: 2024-12-16 | Stop reason: HOSPADM

## 2024-12-15 RX ORDER — CEFAZOLIN SODIUM 1 G/3ML
INJECTION, POWDER, FOR SOLUTION INTRAMUSCULAR; INTRAVENOUS PRN
Status: DISCONTINUED | OUTPATIENT
Start: 2024-12-15 | End: 2024-12-15 | Stop reason: SURG

## 2024-12-15 RX ORDER — OXYCODONE HCL 5 MG/5 ML
5 SOLUTION, ORAL ORAL
Status: COMPLETED | OUTPATIENT
Start: 2024-12-15 | End: 2024-12-15

## 2024-12-15 RX ORDER — ACETAMINOPHEN 500 MG
1000 TABLET ORAL EVERY 6 HOURS
Status: DISCONTINUED | OUTPATIENT
Start: 2024-12-15 | End: 2024-12-16 | Stop reason: HOSPADM

## 2024-12-15 RX ORDER — BUPIVACAINE HYDROCHLORIDE AND EPINEPHRINE 5; 5 MG/ML; UG/ML
INJECTION, SOLUTION EPIDURAL; INTRACAUDAL; PERINEURAL
Status: DISCONTINUED | OUTPATIENT
Start: 2024-12-15 | End: 2024-12-15 | Stop reason: HOSPADM

## 2024-12-15 RX ORDER — CEFTRIAXONE 1 G/1
1000 INJECTION, POWDER, FOR SOLUTION INTRAMUSCULAR; INTRAVENOUS ONCE
Status: COMPLETED | OUTPATIENT
Start: 2024-12-15 | End: 2024-12-15

## 2024-12-15 RX ORDER — CELECOXIB 200 MG/1
200 CAPSULE ORAL 2 TIMES DAILY PRN
Status: DISCONTINUED | OUTPATIENT
Start: 2024-12-20 | End: 2024-12-16 | Stop reason: HOSPADM

## 2024-12-15 RX ORDER — ENOXAPARIN SODIUM 100 MG/ML
40 INJECTION SUBCUTANEOUS DAILY
Status: DISCONTINUED | OUTPATIENT
Start: 2024-12-16 | End: 2024-12-16 | Stop reason: HOSPADM

## 2024-12-15 RX ORDER — HALOPERIDOL 5 MG/ML
1 INJECTION INTRAMUSCULAR
Status: DISCONTINUED | OUTPATIENT
Start: 2024-12-15 | End: 2024-12-15 | Stop reason: HOSPADM

## 2024-12-15 RX ORDER — DEXAMETHASONE SODIUM PHOSPHATE 4 MG/ML
4 INJECTION, SOLUTION INTRA-ARTICULAR; INTRALESIONAL; INTRAMUSCULAR; INTRAVENOUS; SOFT TISSUE
Status: DISCONTINUED | OUTPATIENT
Start: 2024-12-15 | End: 2024-12-16 | Stop reason: HOSPADM

## 2024-12-15 RX ORDER — ONDANSETRON 2 MG/ML
INJECTION INTRAMUSCULAR; INTRAVENOUS PRN
Status: DISCONTINUED | OUTPATIENT
Start: 2024-12-15 | End: 2024-12-15 | Stop reason: SURG

## 2024-12-15 RX ORDER — MEPERIDINE HYDROCHLORIDE 25 MG/ML
12.5 INJECTION INTRAMUSCULAR; INTRAVENOUS; SUBCUTANEOUS
Status: DISCONTINUED | OUTPATIENT
Start: 2024-12-15 | End: 2024-12-15 | Stop reason: HOSPADM

## 2024-12-15 RX ORDER — HYDROMORPHONE HYDROCHLORIDE 1 MG/ML
0.1 INJECTION, SOLUTION INTRAMUSCULAR; INTRAVENOUS; SUBCUTANEOUS
Status: DISCONTINUED | OUTPATIENT
Start: 2024-12-15 | End: 2024-12-15 | Stop reason: HOSPADM

## 2024-12-15 RX ORDER — MIDAZOLAM HYDROCHLORIDE 1 MG/ML
INJECTION INTRAMUSCULAR; INTRAVENOUS PRN
Status: DISCONTINUED | OUTPATIENT
Start: 2024-12-15 | End: 2024-12-15 | Stop reason: SURG

## 2024-12-15 RX ORDER — HYDROMORPHONE HYDROCHLORIDE 1 MG/ML
0.4 INJECTION, SOLUTION INTRAMUSCULAR; INTRAVENOUS; SUBCUTANEOUS
Status: DISCONTINUED | OUTPATIENT
Start: 2024-12-15 | End: 2024-12-15 | Stop reason: HOSPADM

## 2024-12-15 RX ORDER — DEXAMETHASONE SODIUM PHOSPHATE 4 MG/ML
INJECTION, SOLUTION INTRA-ARTICULAR; INTRALESIONAL; INTRAMUSCULAR; INTRAVENOUS; SOFT TISSUE PRN
Status: DISCONTINUED | OUTPATIENT
Start: 2024-12-15 | End: 2024-12-15 | Stop reason: SURG

## 2024-12-15 RX ORDER — MORPHINE SULFATE 4 MG/ML
4 INJECTION INTRAVENOUS ONCE
Status: COMPLETED | OUTPATIENT
Start: 2024-12-15 | End: 2024-12-15

## 2024-12-15 RX ORDER — OXYCODONE HCL 5 MG/5 ML
10 SOLUTION, ORAL ORAL
Status: COMPLETED | OUTPATIENT
Start: 2024-12-15 | End: 2024-12-15

## 2024-12-15 RX ORDER — ONDANSETRON 2 MG/ML
4 INJECTION INTRAMUSCULAR; INTRAVENOUS ONCE
Status: COMPLETED | OUTPATIENT
Start: 2024-12-15 | End: 2024-12-15

## 2024-12-15 RX ORDER — ROCURONIUM BROMIDE 10 MG/ML
INJECTION, SOLUTION INTRAVENOUS PRN
Status: DISCONTINUED | OUTPATIENT
Start: 2024-12-15 | End: 2024-12-15 | Stop reason: SURG

## 2024-12-15 RX ORDER — DIPHENHYDRAMINE HYDROCHLORIDE 50 MG/ML
12.5 INJECTION INTRAMUSCULAR; INTRAVENOUS
Status: DISCONTINUED | OUTPATIENT
Start: 2024-12-15 | End: 2024-12-15 | Stop reason: HOSPADM

## 2024-12-15 RX ORDER — SODIUM CHLORIDE, SODIUM LACTATE, POTASSIUM CHLORIDE, CALCIUM CHLORIDE 600; 310; 30; 20 MG/100ML; MG/100ML; MG/100ML; MG/100ML
INJECTION, SOLUTION INTRAVENOUS CONTINUOUS
Status: DISCONTINUED | OUTPATIENT
Start: 2024-12-15 | End: 2024-12-15 | Stop reason: HOSPADM

## 2024-12-15 RX ORDER — CELECOXIB 200 MG/1
200 CAPSULE ORAL 2 TIMES DAILY
Status: DISCONTINUED | OUTPATIENT
Start: 2024-12-15 | End: 2024-12-16 | Stop reason: HOSPADM

## 2024-12-15 RX ORDER — METRONIDAZOLE 500 MG/100ML
500 INJECTION, SOLUTION INTRAVENOUS EVERY 6 HOURS
Status: COMPLETED | OUTPATIENT
Start: 2024-12-15 | End: 2024-12-15

## 2024-12-15 RX ORDER — SODIUM CHLORIDE, SODIUM LACTATE, POTASSIUM CHLORIDE, CALCIUM CHLORIDE 600; 310; 30; 20 MG/100ML; MG/100ML; MG/100ML; MG/100ML
INJECTION, SOLUTION INTRAVENOUS
Status: DISCONTINUED | OUTPATIENT
Start: 2024-12-15 | End: 2024-12-15 | Stop reason: SURG

## 2024-12-15 RX ORDER — PHENYLEPHRINE HCL IN 0.9% NACL 1 MG/10 ML
SYRINGE (ML) INTRAVENOUS PRN
Status: DISCONTINUED | OUTPATIENT
Start: 2024-12-15 | End: 2024-12-15 | Stop reason: SURG

## 2024-12-15 RX ORDER — ONDANSETRON 2 MG/ML
4 INJECTION INTRAMUSCULAR; INTRAVENOUS
Status: DISCONTINUED | OUTPATIENT
Start: 2024-12-15 | End: 2024-12-15 | Stop reason: HOSPADM

## 2024-12-15 RX ADMIN — Medication 100 MCG: at 14:26

## 2024-12-15 RX ADMIN — SUGAMMADEX 200 MG: 100 INJECTION, SOLUTION INTRAVENOUS at 15:12

## 2024-12-15 RX ADMIN — ONDANSETRON 4 MG: 2 INJECTION INTRAMUSCULAR; INTRAVENOUS at 15:12

## 2024-12-15 RX ADMIN — MIDAZOLAM HYDROCHLORIDE 2 MG: 1 INJECTION, SOLUTION INTRAMUSCULAR; INTRAVENOUS at 14:14

## 2024-12-15 RX ADMIN — FENTANYL CITRATE 50 MCG: 50 INJECTION, SOLUTION INTRAMUSCULAR; INTRAVENOUS at 15:07

## 2024-12-15 RX ADMIN — FENTANYL CITRATE 50 MCG: 50 INJECTION, SOLUTION INTRAMUSCULAR; INTRAVENOUS at 15:40

## 2024-12-15 RX ADMIN — OXYCODONE HYDROCHLORIDE 10 MG: 5 SOLUTION ORAL at 15:46

## 2024-12-15 RX ADMIN — CEFTRIAXONE SODIUM 1000 MG: 1 INJECTION, POWDER, FOR SOLUTION INTRAMUSCULAR; INTRAVENOUS at 11:31

## 2024-12-15 RX ADMIN — Medication 100 MCG: at 14:24

## 2024-12-15 RX ADMIN — ROCURONIUM BROMIDE 50 MG: 50 INJECTION, SOLUTION INTRAVENOUS at 14:19

## 2024-12-15 RX ADMIN — CELECOXIB 200 MG: 200 CAPSULE ORAL at 18:05

## 2024-12-15 RX ADMIN — ACETAMINOPHEN 1000 MG: 500 TABLET, FILM COATED ORAL at 23:07

## 2024-12-15 RX ADMIN — MORPHINE SULFATE 4 MG: 4 INJECTION INTRAVENOUS at 09:03

## 2024-12-15 RX ADMIN — SODIUM CHLORIDE, POTASSIUM CHLORIDE, SODIUM LACTATE AND CALCIUM CHLORIDE: 600; 310; 30; 20 INJECTION, SOLUTION INTRAVENOUS at 14:16

## 2024-12-15 RX ADMIN — PROPOFOL 200 MG: 10 INJECTION, EMULSION INTRAVENOUS at 14:19

## 2024-12-15 RX ADMIN — ONDANSETRON 4 MG: 2 INJECTION INTRAMUSCULAR; INTRAVENOUS at 09:03

## 2024-12-15 RX ADMIN — FENTANYL CITRATE 50 MCG: 50 INJECTION, SOLUTION INTRAMUSCULAR; INTRAVENOUS at 15:52

## 2024-12-15 RX ADMIN — IOHEXOL 100 ML: 350 INJECTION, SOLUTION INTRAVENOUS at 13:00

## 2024-12-15 RX ADMIN — ACETAMINOPHEN 1000 MG: 500 TABLET, FILM COATED ORAL at 18:05

## 2024-12-15 RX ADMIN — METRONIDAZOLE 500 MG: 500 INJECTION, SOLUTION INTRAVENOUS at 11:40

## 2024-12-15 RX ADMIN — MEPERIDINE HYDROCHLORIDE 12.5 MG: 25 INJECTION INTRAMUSCULAR; INTRAVENOUS; SUBCUTANEOUS at 15:42

## 2024-12-15 RX ADMIN — FENTANYL CITRATE 50 MCG: 50 INJECTION, SOLUTION INTRAMUSCULAR; INTRAVENOUS at 14:54

## 2024-12-15 RX ADMIN — DEXAMETHASONE SODIUM PHOSPHATE 8 MG: 4 INJECTION INTRA-ARTICULAR; INTRALESIONAL; INTRAMUSCULAR; INTRAVENOUS; SOFT TISSUE at 14:19

## 2024-12-15 RX ADMIN — CEFAZOLIN 2 G: 1 INJECTION, POWDER, FOR SOLUTION INTRAMUSCULAR; INTRAVENOUS at 14:19

## 2024-12-15 SDOH — ECONOMIC STABILITY: TRANSPORTATION INSECURITY
IN THE PAST 12 MONTHS, HAS LACK OF RELIABLE TRANSPORTATION KEPT YOU FROM MEDICAL APPOINTMENTS, MEETINGS, WORK OR FROM GETTING THINGS NEEDED FOR DAILY LIVING?: NO

## 2024-12-15 SDOH — ECONOMIC STABILITY: TRANSPORTATION INSECURITY
IN THE PAST 12 MONTHS, HAS THE LACK OF TRANSPORTATION KEPT YOU FROM MEDICAL APPOINTMENTS OR FROM GETTING MEDICATIONS?: NO

## 2024-12-15 ASSESSMENT — PATIENT HEALTH QUESTIONNAIRE - PHQ9
1. LITTLE INTEREST OR PLEASURE IN DOING THINGS: NOT AT ALL
2. FEELING DOWN, DEPRESSED, IRRITABLE, OR HOPELESS: NOT AT ALL
SUM OF ALL RESPONSES TO PHQ9 QUESTIONS 1 AND 2: 0

## 2024-12-15 ASSESSMENT — PAIN DESCRIPTION - PAIN TYPE
TYPE: SURGICAL PAIN

## 2024-12-15 ASSESSMENT — SOCIAL DETERMINANTS OF HEALTH (SDOH)
WITHIN THE LAST YEAR, HAVE YOU BEEN AFRAID OF YOUR PARTNER OR EX-PARTNER?: NO
WITHIN THE PAST 12 MONTHS, THE FOOD YOU BOUGHT JUST DIDN'T LAST AND YOU DIDN'T HAVE MONEY TO GET MORE: NEVER TRUE
WITHIN THE PAST 12 MONTHS, YOU WORRIED THAT YOUR FOOD WOULD RUN OUT BEFORE YOU GOT THE MONEY TO BUY MORE: NEVER TRUE
IN THE PAST 12 MONTHS, HAS THE ELECTRIC, GAS, OIL, OR WATER COMPANY THREATENED TO SHUT OFF SERVICE IN YOUR HOME?: NO
WITHIN THE LAST YEAR, HAVE TO BEEN RAPED OR FORCED TO HAVE ANY KIND OF SEXUAL ACTIVITY BY YOUR PARTNER OR EX-PARTNER?: NO
WITHIN THE LAST YEAR, HAVE YOU BEEN HUMILIATED OR EMOTIONALLY ABUSED IN OTHER WAYS BY YOUR PARTNER OR EX-PARTNER?: NO
WITHIN THE LAST YEAR, HAVE YOU BEEN KICKED, HIT, SLAPPED, OR OTHERWISE PHYSICALLY HURT BY YOUR PARTNER OR EX-PARTNER?: NO

## 2024-12-15 ASSESSMENT — LIFESTYLE VARIABLES
CONSUMPTION TOTAL: NEGATIVE
TOTAL SCORE: 0
ON A TYPICAL DAY WHEN YOU DRINK ALCOHOL HOW MANY DRINKS DO YOU HAVE: 2
TOTAL SCORE: 0
HOW MANY TIMES IN THE PAST YEAR HAVE YOU HAD 5 OR MORE DRINKS IN A DAY: 0
TOTAL SCORE: 0
DOES PATIENT WANT TO STOP DRINKING: NO
HAVE YOU EVER FELT YOU SHOULD CUT DOWN ON YOUR DRINKING: NO
AVERAGE NUMBER OF DAYS PER WEEK YOU HAVE A DRINK CONTAINING ALCOHOL: 3
EVER FELT BAD OR GUILTY ABOUT YOUR DRINKING: NO
ALCOHOL_USE: YES
EVER HAD A DRINK FIRST THING IN THE MORNING TO STEADY YOUR NERVES TO GET RID OF A HANGOVER: NO
HAVE PEOPLE ANNOYED YOU BY CRITICIZING YOUR DRINKING: NO

## 2024-12-15 ASSESSMENT — COGNITIVE AND FUNCTIONAL STATUS - GENERAL
MOBILITY SCORE: 24
SUGGESTED CMS G CODE MODIFIER MOBILITY: CH
SUGGESTED CMS G CODE MODIFIER DAILY ACTIVITY: CH
DAILY ACTIVITIY SCORE: 24

## 2024-12-15 ASSESSMENT — FIBROSIS 4 INDEX
FIB4 SCORE: 0.78
FIB4 SCORE: 0.54

## 2024-12-15 NOTE — ANESTHESIA PROCEDURE NOTES
Airway    Date/Time: 12/15/2024 2:20 PM    Performed by: Tobey Gansert, M.D.  Authorized by: Tobey Gansert, M.D.    Location:  OR  Urgency:  Elective  Indications for Airway Management:  Anesthesia      Spontaneous Ventilation: absent    Sedation Level:  Deep  Preoxygenated: Yes    Patient Position:  Sniffing  Final Airway Type:  Endotracheal airway  Final Endotracheal Airway:  ETT  Cuffed: Yes    Technique Used for Successful ETT Placement:  Direct laryngoscopy    Insertion Site:  Oral  Blade Type:  Mookie  Laryngoscope Blade/Videolaryngoscope Blade Size:  3  ETT Size (mm):  7.0  Measured from:  Teeth  ETT to Teeth (cm):  22  Placement Verified by: auscultation and capnometry    Cormack-Lehane Classification:  Grade I - full view of glottis  Number of Attempts at Approach:  1

## 2024-12-15 NOTE — ANESTHESIA TIME REPORT
Anesthesia Start and Stop Event Times       Date Time Event    12/15/2024 1336 Ready for Procedure     1416 Anesthesia Start     1524 Anesthesia Stop          Responsible Staff  12/15/24      Name Role Begin End    Tobey Gansert, M.D. Anesth 1416 1524          Overtime Reason:  no overtime (within assigned shift)    Comments:

## 2024-12-15 NOTE — ED PROVIDER NOTES
ED Provider Note    Scribed for Patircia Arguelles M.D. by Lynette Jaquez. 12/15/2024, 8:26 AM.    Primary care provider: Pcp Pt States None  Means of arrival: Private Vehicle  History obtained from: Patient's family member  History limited by: Language: Taiwanese,  used.     CHIEF COMPLAINT  Chief Complaint   Patient presents with    Abdominal Pain    Vomiting       HPI/ROS  Katina Rider is a 44 y.o. female who presents to the Emergency Department for abdominal pain onset last night at approximately 12:30 AM. The patient's family states that this is the second time she has had abdominal pain, adding that she was seen in the past and had undergone an ultrasound at that time.  She was told she has gallstones.   Patient denies following up with the surgeon they recommended during their last visit to the ED. She has associated nausea and vomiting. She states that the last time she had anything to eat or drink was around 2 PM yesterday.  The patient denies any history of abdominal surgeries. She also denies any allergies to any medications.     EXTERNAL RECORDS REVIEWED  Patient was seen in the ED on 9/11/2024 and 11/01/2024. Patient was diagnosed both times with cholelithiasis and was recommended to follow up with surgeon.      LIMITATION TO HISTORY   Select: Language Taiwanese,  Used     OUTSIDE HISTORIAN(S):  Family who provided the patient's history such as the sequence of events and collateral information as seen above.      PAST MEDICAL HISTORY   Patient was diagnosed with cholelithiasis in 2024.     SURGICAL HISTORY  patient denies any surgical history    SOCIAL HISTORY  Social History     Tobacco Use    Smoking status: Never    Smokeless tobacco: Never   Substance Use Topics    Alcohol use: Never    Drug use: Never      Social History     Substance and Sexual Activity   Drug Use Never       FAMILY HISTORY  No family history on file.    CURRENT MEDICATIONS  Home Medications        Reviewed by Dilshad Velasquez (Pharmacy Tech) on 12/15/24 at 1145  Med List Status: Complete     Medication Last Dose Status   ACETAMINOPHEN PO 12/15/2024 Active   dicyclomine (BENTYL) 20 MG Tab 12/14/2024 Active   ondansetron (ZOFRAN ODT) 4 MG TABLET DISPERSIBLE 12/15/2024 Active                  Audit from Redirected Encounters    **Home medications have not yet been reviewed for this encounter**         ALLERGIES  No Known Allergies    PHYSICAL EXAM  VITAL SIGNS: BP (!) 143/94   Pulse (!) 110   Temp 35.8 °C (96.5 °F) (Temporal)   Resp 20   Wt 63.5 kg (140 lb)   SpO2 97%   BMI 24.03 kg/m²   Vitals reviewed by myself.  Nursing note and vitals reviewed.  Constitutional: Well-developed and well-nourished. Uncomfortable appearing.   HENT: Head is normocephalic and atraumatic.  Eyes: extra-ocular movements intact  Cardiovascular: Tachycardic rate and regular rhythm. No murmur heard.  Pulmonary/Chest: Breath sounds normal. No wheezes or rales.   Abdominal: Soft, right upper quadrant tenderness. No distention.    Musculoskeletal: Extremities exhibit normal range of motion without edema or tenderness.   Neurological: Awake and alert  Skin: Skin is warm and dry. No rash.       DIAGNOSTIC STUDIES:  LABS  Labs Reviewed   CBC WITH DIFFERENTIAL - Abnormal; Notable for the following components:       Result Value    RBC 5.41 (*)     Neutrophils-Polys 76.00 (*)     Lymphocytes 21.20 (*)     All other components within normal limits   COMP METABOLIC PANEL - Abnormal; Notable for the following components:    Glucose 181 (*)     Calcium 10.6 (*)     Total Protein 8.4 (*)     Globulin 3.6 (*)     All other components within normal limits   LIPASE   COV-2, FLU A/B, AND RSV BY PCR (CEPHEID)   HCG QUAL SERUM   ESTIMATED GFR   HCG QUAL SERUM   URINALYSIS,CULTURE IF INDICATED       All labs reviewed and independently interpreted by myself    RADIOLOGY  Final interpretation by radiology demonstrates:    US-RUQ   Final Result       Positive for acute cholecystitis, as above.      CT-ABDOMEN-PELVIS WITH    (Results Pending)     The radiologist's interpretation of all radiological studies have been reviewed by me.    COURSE & MEDICAL DECISION MAKING    INITIAL ASSESSMENT, ED COURSE AND PLAN    Patient is a 44-year-old female who presents for evaluation of abdominal pain.  Differential diagnosis includes cholecystitis, cholelithiasis, choledocholithiasis, peptic ulcer disease, pancreatitis.  Diagnostic workup includes labs and right upper quadrant ultrasound.    Patient's initial vitals notable for tachycardia and hypertension.  Patient appears uncomfortable on exam with right upper quadrant tenderness.  She is treated with morphine and Zofran after which she feels improved.  Labs returned and lipase is within normal limits welling up pancreatitis.  There is no leukocytosis.  LFTs are within normal limits making obstructive biliary pathology unlikely.  Labs are otherwise unremarkable.  Ultrasound returns and is consistent with acute cholecystitis with cholelithiasis.  Case discussed with surgeon Dr. Giraldo who evaluated patient and will plan to take her to the OR for definitive management.  On his exam he did note some right lower quadrant tenderness, he will plan to get a CT of the abdomen prior to the OR to assess for possible appendicitis.  Patient is started on Rocephin and Flagyl and will be hospitalized under surgical service for ongoing management.  Patient is in guarded condition.       REASSESSMENTS     8:26 AM - Patient presents to the ED with abdominal pain and vomiting. Discussed the plan of care with the patient including providing the patient with medications as well as ordering labs and imaging to further evaluate the patient's condition. The patient was able to ask questions at this time. Patient agrees with the plan of care.     10:55 AM - Patient was reevaluated at bedside. Discussed lab and radiology results with the patient  and informed them that they have a gallbladder infection. I discussed with the patient that due to their current condition they will be admitted to the hospital. Patient was able to ask questions at this time and agrees to the plan of hospitalization.           DISPOSITION AND DISCUSSIONS  I have discussed management of the patient with the following physicians and [place]'s: Dr. Giraldo    Discussion of management with other [facility] or appropriate source(s): None     Barriers to care at this time, including but not limited to: Patient does not have established PCP.     Decision tools and prescription drugs considered including, but not limited to:  None .    DISPOSITION:  Patient will be hospitalized by Dr. Giraldo in guarded condition.    FINAL IMPRESSION  1. Cholecystitis    2. Nausea    3. Right upper quadrant abdominal pain          ILynette (Scribe), am scribing for, and in the presence of, Patricia Arguelles M.D..    Electronically signed by: Lynette Jaquez (Scribe), 12/15/2024    IPatricia M.D. personally performed the services described in this documentation, as scribed by Lynette Jaquez in my presence, and it is both accurate and complete.    The note accurately reflects work and decisions made by me.  Patricia Arguelles M.D.  12/15/2024  12:35 PM

## 2024-12-15 NOTE — H&P
CHIEF COMPLAINT: Right upper quadrant pain     HISTORY OF PRESENT ILLNESS: The patient is a 44 year-old  woman who presents to the Emergency Department a 12-hour history of moderate right upper quadrant  abdominal pain. The pain is associated with nausea and vomiting. She reports occasional precipitation and exacerbation of symptoms with ingestion of all types of foods.  She admits a family history of gallstones. She denies any history of ulcer, gastritis, cholangitis, pancreatitis, hepatitis, or previous abdominal ailments. The patient has never had any prior abdominal surgeries.    PAST MEDICAL HISTORY:  has no past medical history on file.    PAST SURGICAL HISTORY:  has no past surgical history on file.    ALLERGIES: No Known Allergies    CURRENT MEDICATIONS:    Home Medications    **Home medications have not yet been reviewed for this encounter**       Audit from Redirected Encounters    **Home medications have not yet been reviewed for this encounter**         FAMILY HISTORY: family history is not on file.    SOCIAL HISTORY:  reports that she has never smoked. She has never used smokeless tobacco. She reports that she does not drink alcohol and does not use drugs.    REVIEW OF SYSTEMS: Review of systems is remarkable for the following abdominal pain, nausea, and vomiting. The remainder of the comprehensive ROS is negative with the exception of the aforementioned HPI, PMH, and PSH bullets in accordance with CMS guideline.    PHYSICAL EXAMINATION:      Constitutional:     Vital Signs: BP (!) 143/94   Pulse (!) 110   Temp 35.8 °C (96.5 °F) (Temporal)   Resp 20   Wt 63.5 kg (140 lb)   SpO2 97%    General Appearance: appears stated age, is in mild distress.  HEENT: The pupils are equal, round, and reactive to light bilaterally. The extraocular muscles are intact bilaterally. The sclera are non icteric. Nares and oropharynx are clear.   Neck: Supple. No adenopathy.  Respiratory:   Inspection:  Unlabored respirations, no intercostal retractions, paradoxical motion, or accessory muscle use.   Auscultation: normal.  Cardiovascular:   Inspection: The skin is warm and dry.  Auscultation: Regular rate and rhythm.   Peripheral Pulses: Normal.   Abdomen:  Inspection: Abdominal inspection reveals no abrasions, contusions, lacerations or penetrating wounds.   Palpation: Palpation is remarkable for moderate tenderness in the right upper quadrant  region. No abdominal wall hernias.  Extremities:   Examination of the upper and lower extremities demonstrates no cyanosis edema or clubbing.  Neurologic:   Alert & oriented to person, time and place. Normal motor function. Normal sensory function. No focal deficits noted.    LABORATORY VALUES:   Recent Labs     12/15/24  0807   WBC 6.5   RBC 5.41*   HEMOGLOBIN 15.5   HEMATOCRIT 45.4   MCV 83.9   MCH 28.7   MCHC 34.1   RDW 41.0   PLATELETCT 300   MPV 11.6     Recent Labs     12/15/24  0807   SODIUM 141   POTASSIUM 4.4   CHLORIDE 102   CO2 23   GLUCOSE 181*   BUN 12   CREATININE 0.72   CALCIUM 10.6*     Recent Labs     12/15/24  0807   ASTSGOT 20   ALTSGPT 14   TBILIRUBIN 0.3   ALKPHOSPHAT 98   GLOBULIN 3.6*            IMAGING:   US-RUQ   Final Result      Positive for acute cholecystitis, as above.          ASSESSMENT AND PLAN:       The patient has Grade I (mild) acute cholecystitis. Plan: Laparoscopic cholecystectomy.    Given her focal right lower quadrant peritonitis A pre-operative CT scan is also indicated to rule out another etiology for pain such as appendicitis, TOA, etc.      The patient will be taken to the operating room for Laparoscopic cholecystectomy. The surgical conduct was discussed in detail. Potential complications including, but not limited to, infection, bleeding, damage to adjacent structures, bile duct injury, need to convert to an open procedure, and anesthetic complications were discussed. Operative consent signed.     Video Maori interpretor  Fernanda [id number] utilized during all portions of this exam including discussion of plan and obtaining consent for surgery       Ronan Giraldo M.D.    DD: 12/15/2024  11:13 AM    [place] Guidelines for Acute Cholecystitis 2018  AAST Grading System for EGS Conditions  ACS NSQIP Surgical Risk Calculator

## 2024-12-15 NOTE — ED NOTES
Medication history reviewed with patient and family at bedside via Ipad  (Octavio [id number]).   Med rec is complete  Allergies reviewed.     Patient has not had any outpatient antibiotics in the last 30 days.   Anticoagulants: No    Dilshad Velasquez

## 2024-12-15 NOTE — LETTER
Name: Katina Rider  : 1980    Date: 2024      To Whom it May Concern,       Please excuse Katina from work for 2 weeks. Follow up in 2 weeks at acute care surgery clinic for work release. She should not lift over 15 pounds for 4 weeks total.   Please call [facility] for questions or concerns.         Thanks,   Allison Villanueva PA-C  Electronically signed

## 2024-12-15 NOTE — PROGRESS NOTES
PREOPERATIVE NOTE: I have seen and examined the patient today.  Critical physical examination findings, laboratory indices, and radiographic studies reviewed.  I recommend Laparoscopic cholecystectomy.    The operative strategy was explained and reviewed. Alternatives discussed. Potential complications including, but not limited to, infection, bleeding, damage to adjacent structures, and anesthetic complications were discussed. Operative consent signed.        ____________________________________     Ronan Giraldo M.D.    DD: 12/15/2024  12:52 PM

## 2024-12-15 NOTE — DISCHARGE INSTRUCTIONS
Post Operative Discharge Instructions:    1. DIET: Upon discharge from the hospital, you may resume your normal preoperative diet, unless specifically directed otherwise. Depending on how you are feeling and whether you have nausea or not, you may wish to stay with a bland diet for the first few days. However, you can advance this as quickly as you feel ready.    2. ACTIVITIES: After discharge from the hospital, you may resume full routine activities; however, there should be no heavy lifting (greater than 20 pounds or a bag of groceries) and no strenuous activities for at least 2 weeks. The duration may be longer, depending on your surgical procedure. Routine activities of daily living are acceptable. Activity level should be addressed at your post-op follow up appointment.    3. DRIVING: You may drive whenever you are off pain medications and are able to perform the activities needed to drive, i.e., turning, bending, twisting, etc.    4. BATHING: You may get the wound wet at any time after leaving the hospital. You may shower, but do not submerge in a bath for at least two weeks.  If you have wound dressings, they may come off after 48 hours.  If you have skin glue to the wound, this will fall off on its own, do not pick at it.  If you have Steri-Strips to the wound, these will fall off on their own, do not pick at them. You may trim the edges if needed.    5. BOWEL FUNCTION:   After surgery, it is not uncommon for patients to develop either frequent or loose stools after meals. This usually corrects itself after a few days, to a few weeks. If this occurs, do not worry; this will resolve on its own.  Constipation is much more common than loose stools. The cause is the combination of pain medication and decreased activity level and possibly the nature of the surgical procedure performed. If you feel this is occurring, you may use an over-the-counter treatment such as MiraLAX (or Milk of Magnesia, Ex-Lax,  Senokot, etc.) until the problem has resolved. Drink plenty of water and try to wean off narcotic pain medications as soon as is comfortable for you.    6. PAIN MEDICATION:   You will be given a prescription for pain medication at discharge. Please take these as directed. It is important to remember not to take medications on an empty stomach as this may cause nausea.  You may also take over the counter acetaminophen and/or NSAIDS (ibuprofen, Aleve, Advil, Motrin) per the package instructions.  You may also use ice to the wound to decrease pain and swelling. You may alternate 20 minutes on and 20 minutes off with the ice for the first 24-48 hours. Make sure you place a washcloth or towel between the ice pack and your skin.  Please note that narcotic pain medication cannot be refilled unless you are seen by a doctor. Make sure you call the office if you are running low on medication or if the dose you have been prescribed is not working well for you.    7.CALL THE [facility] AT [phone] IF YOU HAVE:  (1) Fevers to more than 101F, (2) Unusual chest or leg pain, (3) Drainage or fluid from incision that may be foul smelling, increased tenderness or soreness at the wound or the wound edges are no longer together, redness or swelling at the incision site. Do not hesitate to call with any other questions.       Discharge Instructions    Discharged to home by car with friend. Discharged via wheelchair, hospital escort: Yes.  Special equipment needed: Not Applicable    Be sure to schedule a follow-up appointment with your primary care doctor or any specialists as instructed.     Discharge Plan:   Diet Plan: Discussed  Activity Level: Discussed  Confirmed Follow up Appointment: Patient to Call and Schedule Appointment  Confirmed Symptoms Management: Discussed  Medication Reconciliation Updated: Yes  Influenza Vaccine Indication: Patient Refuses    I understand that a diet low in cholesterol, fat, and sodium  is recommended for good health. Unless I have been given specific instructions below for another diet, I accept this instruction as my diet prescription.   Other diet: Regular, baja en grasas.    Special Instructions: None    -Is this patient being discharged with medication to prevent blood clots?  No    Is patient discharged on Warfarin / Coumadin?   No       Colecistectomía mínimamente invasiva, cuidados posteriores  Minimally Invasive Cholecystectomy, Care After  ¿Qué puedo esperar después del procedimiento?  Después del procedimiento, es común tener los siguientes síntomas:  Sentir dolor en las zonas de la cirugía. Le darán medicamentos para el dolor.  Vomitar o tener náuseas.  Sentir el vientre lleno (meteorismo) o tener dolor en el hombro. [place] se debe al gas que se usó kendra la cirugía.  Siga estas instrucciones en hooper casa:  Medicamentos  Use los medicamentos de venta nicko y los recetados solamente mei se lo haya indicado el médico.  Si le recetaron un antibiótico, tómelo mei se lo haya indicado el médico. No deje de tomarlo aunque comience a sentirse mejor.  Si se lo indican, tome medidas a fin de prevenir problemas para ir de cuerpo (estreñimiento). Es posible que deba hacer lo siguiente:  Beber suficiente líquido para mantener el pis (la orina) de color amarillo pálido.  Ca medicamentos. Le dirán qué medicamentos debe ca.  [place] alimentos ricos en fibra. Entre ellos, frijoles, cereales integrales y frutas y verduras frescas.  Limitar los alimentos con alto contenido de grasa y azúcar. Estos incluyen alimentos fritos o dulces.  Pregunte al médico si debe evitar conducir o utilizar máquinas mientras michael los medicamentos.  Cuidado de la incisión    Siga las instrucciones del médico en lo que respecta al cuidado de los alicea de la cirugía (incisiones). Asegúrese de hacer lo siguiente:  Lávese las aramis con agua y jabón kendra al menos 20 segundos antes y después de cambiarse la venda (vendaje).  Use un desinfectante para aramis si no dispone de agua y jabón.  Cámbiese la venda.  Deje los puntos (suturas) o la goma para cerrar la piel en hooper lugar kendra al menos 2 semanas.  Deje colocadas las tiras de cinta adhesiva a menos que se le indique que se las quite. Puede recortar los bordes de las tiras de cinta si se enrollan.  No tome barb de inmersión, no practique natación ni utilice el jacuzzi. Pregunte a hooper médico si puede ducharse o darse barb de esponja.  Controle la cedrick de la incisión todos los días para detectar signos de infección. Esté atento a los siguientes signos:  Aumento del enrojecimiento, la hinchazón o el dolor.  Líquido o bhavesh.  Calor.  Pus o mal olor.  Actividad  Darlene reposo mei se lo haya indicado el médico. No realice actividades que requieran mucho esfuerzo.  Levántese y camine un poco cada 1 a 2 horas. Pida ayuda si se siente débil o inestable.  No levante objetos que pesen más de 10 libras (4.5 kg) o que leah más pesados de lo que le indicaron.  No practique deportes de contacto hasta que el médico lo autorice.  No retome el trabajo ni el estudio hasta que el médico lo autorice.  Retome anthony actividades normales cuando el médico le diga que es seguro.  Instrucciones generales  Si le administraron un sedante kendra el procedimiento, no conduzca ni use máquinas hasta que el médico le indique que es seguro hacerlo. Un sedante es un medicamento que ayuda a relajarse.  Concurra a todas las visitas de seguimiento.  Comuníquese con un médico si:  Aparece francisco erupción cutánea.  Aumentan el enrojecimiento, la hinchazón o el dolor alrededor de las incisiones.  Le sale líquido o bhavesh de las incisiones.  Las incisiones están calientes al tacto.  Tiene pus o percibe que sale mal olor del lugar de las incisiones.  Tiene fiebre.  Francisco o más de las incisiones se abren.  Solicite ayuda de inmediato si:  Tiene dificultad para respirar.  Siente dolor en el pecho.  Siente dolor que empeora en la cedrick  de los hombros.  Se desmaya o se siente mareado al ponerse de pie.  Tiene dolor muy intenso en el vientre (abdomen).  Siente que va a vomitar o vomita y esto dura más de un día.  Siente dolor en la pierna.  Estos síntomas pueden indicar francisco emergencia. Solicite ayuda de inmediato. Llame al 911.  No espere a arsalan si los síntomas desaparecen.  No conduzca por anthony propios medios hasta el hospital.  Resumen  Después de la cirugía, es común sentir dolor en las zonas de la cirugía. También puede tener vómitos o sentir llenura en el vientre.  Siga las instrucciones del médico acerca de los medicamentos, la restricción de actividades y el cuidado de las zonas de la cirugía. No realice actividades que requieran mucho esfuerzo.  Comuníquese con el médico si tiene fiebre u otros signos de infección, mei más enrojecimiento, hinchazón o dolor alrededor de las incisiones.  Busque ayuda de inmediato si tiene dolor de pecho, dolor en los hombros que va en aumento o problemas para respirar.  Esta información no tiene mei fin reemplazar el consejo del médico. Asegúrese de hacerle al médico cualquier pregunta que tenga.  Document Revised: 07/06/2022 Document Reviewed: 07/06/2022  Elsevier Patient Education © 2023 [employer]

## 2024-12-15 NOTE — ASSESSMENT & PLAN NOTE
Urinalysis positive for UTI   Treated with Rocephin   Will need transition to oral antibiotics on discharge

## 2024-12-15 NOTE — ANESTHESIA PREPROCEDURE EVALUATION
Case: [id number] Date/Time: 12/15/24 1514    Procedure: CHOLECYSTECTOMY, LAPAROSCOPIC    Location: [facility] OR 09 / SURGERY [facility]    Surgeons: Ronan Giraldo M.D.            Relevant Problems   No relevant active problems       Physical Exam    Airway   Mallampati: II  TM distance: >3 FB  Neck ROM: full       Cardiovascular - normal exam  Rhythm: regular  Rate: normal  (-) murmur     Dental - normal exam           Pulmonary - normal exam  Breath sounds clear to auscultation     Abdominal    Neurological - normal exam                   Anesthesia Plan    ASA 2- EMERGENT   ASA physical status emergent criteria: acutely contaminated wound or identified infection source    Plan - general       Airway plan will be ETT          Induction: intravenous    Postoperative Plan: Postoperative administration of opioids is intended.    Pertinent diagnostic labs and testing reviewed    Informed Consent:    Anesthetic plan and risks discussed with patient.    Use of blood products discussed with: patient whom consented to blood products.

## 2024-12-15 NOTE — OP REPORT
DATE OF OPERATION:   12/15/2024     PREOPERATIVE DIAGNOSIS: acute cholecystitis.    POSTOPERATIVE DIAGNOSIS: acute cholecystitis.    PROCEDURE PERFORMED: Laparoscopic cholecystectomy    SURGEON:    Ronan Giraldo M.D.    ASSISTANT:    Allison Villanueva PA-C.     ANESTHESIOLOGIST:  Tobey B. Gansert, M.D.    ANESTHESIA:   General endotracheal anesthesia.    ASA CLASSIFICATION:  II. Emergent.    INDICATIONS: The patient is a 44 year-old woman with clinical and radiographic findings of acute cholecystitis. She is taken to the operating room for a laparoscopic cholecystectomy.     The nature of the surgical procedure warranted additional skilled operative assistance from a licensed Physician Assistant (ROYA). The assistant was present during the entire operation. The surgical assistant performed the following: provided assistance with optimal surgical exposure of the operative field and provided high complexity, subspecialty decision making input.     FINDINGS: Gallbladder wall thickening and acute inflammation.    WOUND CLASSIFICATION:  Class III, Contaminated.    SPECIMEN:    Gallbladder.    ESTIMATED BLOOD LOSS:  10 mL.    PROCEDURE: Following informed consent consent, the patient was properly identified, taken to the operating room and placed in supine position where general endotracheal anesthesia was administered. Intravenous antibiotics were administered by the anesthesiologist in correct time interval. The patient voided prior to surgery. A urinary catheter was not placed. Sequential compression devices were employed. The abdomen was prepped and draped into a sterile field. A timeout was conducted with the full attention and participation of all operating room personnel.    Marcaine 0.5% was used to infiltrate the port sites. A 5 mm incision was made just to the right of the umbilicus and a 5mm optical trocar was advanced under laparoscopic guidance. Two attempts were made but each time, there was a large  amount of fat below the peritoneal lining and the abdomen did no insufflate concerning for adhesive disease.  The initial entry location was then switched to the left upper quadrant.  A 5 mm incision was made just below the left costal margin and a 5mm optical trocar was advanced under laparoscopic guidance without issue. Once confirmed to be in the intraperitoneal cavity, carbon dioxide pneumoperitoneum was instilled.  The area beneath the initial periumbilical entry site was examined.  There was gas below the omentum but the mesentery and bowel were without any evidence of injury.  A 5 mm 30 degree lens and camera was passed into the peritoneal cavity. An 11 mm port was placed in the epigastric midline under direct vision. Two 5 mm right subcostal ports were placed under direct vision.       The gallbladder was identified and elevated. Dissection was carried out to completely expose and delineate the hepatocystic triangle. The critical view was achieved definitively identifying the single cystic duct and single cystic artery entering the gallbladder. These structures were multiply clipped proximally, once distally and divided. The gallbladder was dissected free from the undersurface of the liver using electrocautery.  The backwall of the gallbladder was attenuated due to a large stone and markedly intrahepatic.  The gallbladder back wall was violated several times during dissection from the liver resulting in spillage of bile.  The gallbladder was placed within a laparoscopic specimen retrieval bag. The gallbladder was delivered intact from the abdominal cavity and submitted for pathology.  The gallbladder fossa was irrigated.  All excess irrigant was evacuated from the abdominal cavity.  The gallbladder fossa was inspected. Hemostasis was satisfactory.    The epigastric port site fascia was approximated using a trocar site closure device with a 0 VICRYL® Plus Antibacterial suture.    The patient tolerated the  procedure well, and there were no apparent complications. All sponge, needle, and instrument counts were correct on 2 separate occasions. The patient was awakened, extubated, and transferred to  to the post anesthesia care unit (PACU) in satisfactory condition.       ____________________________________     Ronan Girlado M.D.    DD: 12/15/2024  3:27 PM

## 2024-12-16 ENCOUNTER — PHARMACY VISIT (OUTPATIENT)
Dept: PHARMACY | Facility: MEDICAL CENTER | Age: 44
End: 2024-12-16
Payer: COMMERCIAL

## 2024-12-16 VITALS
OXYGEN SATURATION: 92 % | BODY MASS INDEX: 24.03 KG/M2 | SYSTOLIC BLOOD PRESSURE: 128 MMHG | RESPIRATION RATE: 18 BRPM | DIASTOLIC BLOOD PRESSURE: 73 MMHG | WEIGHT: 139.99 LBS | TEMPERATURE: 97.7 F | HEART RATE: 62 BPM

## 2024-12-16 LAB
ALBUMIN SERPL BCP-MCNC: 4 G/DL (ref 3.2–4.9)
ALBUMIN/GLOB SERPL: 1.3 G/DL
ALP SERPL-CCNC: 81 U/L (ref 30–99)
ALT SERPL-CCNC: 81 U/L (ref 2–50)
ANION GAP SERPL CALC-SCNC: 11 MMOL/L (ref 7–16)
AST SERPL-CCNC: 115 U/L (ref 12–45)
BASOPHILS # BLD AUTO: 0.3 % (ref 0–1.8)
BASOPHILS # BLD: 0.03 K/UL (ref 0–0.12)
BILIRUB SERPL-MCNC: 0.3 MG/DL (ref 0.1–1.5)
BUN SERPL-MCNC: 13 MG/DL (ref 8–22)
CALCIUM ALBUM COR SERPL-MCNC: 9.5 MG/DL (ref 8.5–10.5)
CALCIUM SERPL-MCNC: 9.5 MG/DL (ref 8.5–10.5)
CHLORIDE SERPL-SCNC: 104 MMOL/L (ref 96–112)
CO2 SERPL-SCNC: 24 MMOL/L (ref 20–33)
CREAT SERPL-MCNC: 0.74 MG/DL (ref 0.5–1.4)
EOSINOPHIL # BLD AUTO: 0 K/UL (ref 0–0.51)
EOSINOPHIL NFR BLD: 0 % (ref 0–6.9)
ERYTHROCYTE [DISTWIDTH] IN BLOOD BY AUTOMATED COUNT: 42.4 FL (ref 35.9–50)
GFR SERPLBLD CREATININE-BSD FMLA CKD-EPI: 102 ML/MIN/1.73 M 2
GLOBULIN SER CALC-MCNC: 3 G/DL (ref 1.9–3.5)
GLUCOSE SERPL-MCNC: 112 MG/DL (ref 65–99)
HCT VFR BLD AUTO: 39.1 % (ref 37–47)
HGB BLD-MCNC: 13.2 G/DL (ref 12–16)
IMM GRANULOCYTES # BLD AUTO: 0.03 K/UL (ref 0–0.11)
IMM GRANULOCYTES NFR BLD AUTO: 0.3 % (ref 0–0.9)
LYMPHOCYTES # BLD AUTO: 1.63 K/UL (ref 1–4.8)
LYMPHOCYTES NFR BLD: 16.2 % (ref 22–41)
MCH RBC QN AUTO: 28.8 PG (ref 27–33)
MCHC RBC AUTO-ENTMCNC: 33.8 G/DL (ref 32.2–35.5)
MCV RBC AUTO: 85.4 FL (ref 81.4–97.8)
MONOCYTES # BLD AUTO: 0.55 K/UL (ref 0–0.85)
MONOCYTES NFR BLD AUTO: 5.5 % (ref 0–13.4)
NEUTROPHILS # BLD AUTO: 7.8 K/UL (ref 1.82–7.42)
NEUTROPHILS NFR BLD: 77.7 % (ref 44–72)
NRBC # BLD AUTO: 0 K/UL
NRBC BLD-RTO: 0 /100 WBC (ref 0–0.2)
PATHOLOGY CONSULT NOTE: NORMAL
PLATELET # BLD AUTO: 265 K/UL (ref 164–446)
PMV BLD AUTO: 11.2 FL (ref 9–12.9)
POTASSIUM SERPL-SCNC: 4.1 MMOL/L (ref 3.6–5.5)
PROT SERPL-MCNC: 7 G/DL (ref 6–8.2)
RBC # BLD AUTO: 4.58 M/UL (ref 4.2–5.4)
SODIUM SERPL-SCNC: 139 MMOL/L (ref 135–145)
WBC # BLD AUTO: 10 K/UL (ref 4.8–10.8)

## 2024-12-16 PROCEDURE — A9270 NON-COVERED ITEM OR SERVICE: HCPCS | Performed by: PHYSICIAN ASSISTANT

## 2024-12-16 PROCEDURE — 700102 HCHG RX REV CODE 250 W/ 637 OVERRIDE(OP): Performed by: PHYSICIAN ASSISTANT

## 2024-12-16 PROCEDURE — G0378 HOSPITAL OBSERVATION PER HR: HCPCS

## 2024-12-16 PROCEDURE — 96376 TX/PRO/DX INJ SAME DRUG ADON: CPT

## 2024-12-16 PROCEDURE — 99024 POSTOP FOLLOW-UP VISIT: CPT | Performed by: PHYSICIAN ASSISTANT

## 2024-12-16 PROCEDURE — 85025 COMPLETE CBC W/AUTO DIFF WBC: CPT

## 2024-12-16 PROCEDURE — 700105 HCHG RX REV CODE 258: Performed by: PHYSICIAN ASSISTANT

## 2024-12-16 PROCEDURE — 700111 HCHG RX REV CODE 636 W/ 250 OVERRIDE (IP): Performed by: PHYSICIAN ASSISTANT

## 2024-12-16 PROCEDURE — 80053 COMPREHEN METABOLIC PANEL: CPT

## 2024-12-16 PROCEDURE — RXMED WILLOW AMBULATORY MEDICATION CHARGE: Performed by: PHYSICIAN ASSISTANT

## 2024-12-16 RX ORDER — CELECOXIB 200 MG/1
200 CAPSULE ORAL DAILY
Qty: 7 CAPSULE | Refills: 0 | Status: SHIPPED | OUTPATIENT
Start: 2024-12-16 | End: 2024-12-23

## 2024-12-16 RX ORDER — ACETAMINOPHEN 500 MG
500-1000 TABLET ORAL EVERY 6 HOURS PRN
COMMUNITY
Start: 2024-12-16

## 2024-12-16 RX ADMIN — CEFTRIAXONE SODIUM 1000 MG: 10 INJECTION, POWDER, FOR SOLUTION INTRAVENOUS at 06:14

## 2024-12-16 RX ADMIN — ACETAMINOPHEN 1000 MG: 500 TABLET, FILM COATED ORAL at 06:12

## 2024-12-16 RX ADMIN — CELECOXIB 200 MG: 200 CAPSULE ORAL at 06:14

## 2024-12-16 ASSESSMENT — PAIN DESCRIPTION - PAIN TYPE: TYPE: SURGICAL PAIN

## 2024-12-16 NOTE — PROGRESS NOTES
4 Eyes Skin Assessment Completed by [facility] RN and DIAMOND Valderrama.    Head WDL  Ears WDL  Nose WDL  Mouth WDL  Neck WDL  Breast/Chest WDL  Shoulder Blades WDL  Spine WDL  (R) Arm/Elbow/Hand WDL  (L) Arm/Elbow/Hand WDL  Abdomen Incision  Groin WDL  Scrotum/Coccyx/Buttocks WDL  (R) Leg WDL  (L) Leg WDL  (R) Heel/Foot/Toe WDL  (L) Heel/Foot/Toe WDL          Devices In Places Pulse Ox      Interventions In Place Pillows    Possible Skin Injury No    Pictures Uploaded Into Epic Yes  Wound Consult Placed N/A  RN Wound Prevention Protocol Ordered No

## 2024-12-16 NOTE — CARE PLAN
The patient is Stable - Low risk of patient condition declining or worsening    Shift Goals  Clinical Goals: tolerate regular diet, ambulate  Patient Goals: rest, discharge  Family Goals: no family present    Progress made toward(s) clinical / shift goals:    Problem: Pain - Standard  Goal: Alleviation of pain or a reduction in pain to the patient’s comfort goal  Outcome: Progressing     Problem: Knowledge Deficit - Standard  Goal: Patient and family/care givers will demonstrate understanding of plan of care, disease process/condition, diagnostic tests and medications  Outcome: Progressing     Problem: Early Mobilization - Post Surgery  Goal: Early mobilization post surgery  Outcome: Progressing     Problem: Pain - Post Surgery  Goal: Alleviation or reduction of pain post surgery  Outcome: Progressing       Patient is not progressing towards the following goals:

## 2024-12-16 NOTE — ANESTHESIA POSTPROCEDURE EVALUATION
Patient: Katina Rider    Procedure Summary       Date: 12/15/24 Room / Location: [facility] 09 / SURGERY [facility]    Anesthesia Start: 1416 Anesthesia Stop: 1524    Procedure: CHOLECYSTECTOMY, LAPAROSCOPIC (Abdomen) Diagnosis: (ACUTE CHOLECYSTITIS)    Surgeons: Ronan Giraldo M.D. Responsible Provider: Tobey Gansert, M.D.    Anesthesia Type: general ASA Status: 2 - Emergent            Final Anesthesia Type: general  Last vitals  BP   Blood Pressure: (!) 144/85    Temp   36.2 °C (97.2 °F)    Pulse   86   Resp   19    SpO2   91 %      Anesthesia Post Evaluation    Patient location during evaluation: PACU  Patient participation: complete - patient participated  Level of consciousness: awake and alert    Airway patency: patent  Anesthetic complications: no  Cardiovascular status: hemodynamically stable  Respiratory status: acceptable  Hydration status: euvolemic    PONV: none          No notable events documented.     Nurse Pain Score: 7 (NPRS)

## 2024-12-16 NOTE — OR NURSING
Patient resting comfortably, denies nausea. Patient's cousin at bedside. VSS. Surgical sites CDI, open to air. Report provided to Arsalan De Souza RN. Patient transported off unit via wheelchair with transport on 10L O2 (ERAS protocol) @ 97%. Family in possessions of all belongings.  utilized for patient encounter.

## 2024-12-16 NOTE — DISCHARGE SUMMARY
DISCHARGE  SUMMARY    DATE OF ADMISSION: 12/15/2024    DATE OF DISCHARGE: 12/16/2024    DISCHARGE DIAGNOSIS:  Principal Problem:    Acute cholecystitis  Active Problems:    No contraindication to deep vein thrombosis (DVT) prophylaxis    Acute cystitis  Resolved Problems:    * No resolved hospital problems. *      CONSULTATIONS:  Acute care surgery [place] service    PROCEDURES:  Laparoscopic cholecystectomy by Ronan Giraldo MD, on 12/15/2024    BRIEF HPI and HOSPITAL COURSE:  44-year-old  female who presented to the emergency department and with complaints of worsening right upper quadrant abdominal pain over the last 12 hours.  Imaging and clinical findings consistent with acute cholecystitis.  She underwent the operation as above.  She was admitted overnight for observation and postoperative care.  The patient was also treated for a UTI.  CT scan showed possible small bowel obstruction however the patient was tolerating a regular diet and bowel function is normal.  On day of discharge, her pain is controlled and port sites are well-approximated and healing.    DISPOSITION:   Discharged home on 12/16/2024. The patient was counseled and questions were answered. Specifically, signs and symptoms of infection, respiratory decompensation, and persistent or worsening pain were discussed and the patient agrees to seek medical attention if any of these develop.    DISCHARGE MEDICATIONS:  The patients controlled substance history was reviewed and a controlled substance use informed consent (if applicable) was provided by [facility] and the patient has been prescribed.     Medication List        START taking these medications        Instructions   celecoxib 200 MG Caps  Commonly known as: CeleBREX   Take 1 Capsule by mouth every day for 7 days.  Dose: 200 mg            CHANGE how you take these medications        Instructions   acetaminophen 500 MG Tabs  What changed:   medication  strength  how much to take  when to take this  reasons to take this  Commonly known as: Tylenol   Take 1-2 Tablets by mouth every 6 hours as needed for Moderate Pain or Mild Pain.  Dose: 500-1,000 mg            CONTINUE taking these medications        Instructions   dicyclomine 20 MG Tabs  Commonly known as: Bentyl   Take 1 Tablet by mouth every 6 hours.  Dose: 20 mg     ondansetron 4 MG Tbdp  Commonly known as: Zofran ODT   Take 1 Tablet by mouth every 6 hours as needed for Nausea/Vomiting.  Dose: 4 mg            You will be given a prescription for pain medication at discharge. Please take these as directed. It is important to remember not to take medications on an empty stomach as this may cause nausea.  You may also take over the counter acetaminophen and/or NSAIDS (ibuprofen, Aleve, Advil, Motrin) per the package instructions.  You may also use ice to the wound to decrease pain and swelling. You may alternate 20 minutes on and 20 minutes off with the ice for the first 24-48 hours. Make sure you place a washcloth or towel between the ice pack and your skin.  Please note that narcotic pain medication cannot be refilled unless you are seen by a doctor. Make sure you call the office if you are running low on medication or if the dose you have been prescribed is not working well for you.    ACTIVITY:  After discharge from the hospital, you may resume full routine activities; however, there should be no heavy lifting (greater than 20 pounds or a bag of groceries) and no strenuous activities for at least 2 weeks. The duration may be longer, depending on your surgical procedure. Routine activities of daily living are acceptable. Activity level should be addressed at your post-op follow up appointment. You may drive whenever you are off pain medications and are able to perform the activities needed to drive, i.e., turning, bending, twisting, etc.      WOUND CARE:  You may shower, but do not submerge in a bath for at  least two weeks. If you have wound dressings, they may come off after 48 hours. If you have skin glue to the wound, this will fall off on its own, do not pick at it. If you have steri strips to the wound, these will fall off on their own, do not pick at them, may trim the edges if needed.      DIET:  Upon discharge from the hospital, you may resume your normal preoperative diet, unless specifically directed otherwise. Depending on how you are feeling and whether you have nausea or not, you may wish to stay with a bland diet for the first few days. However, you can advance this as quickly as you feel ready.      FOLLOW UP:  [facility]  [address]  [facility] [address]  [phone]    Schedule an appointment as soon as possible for a visit in 2 week(s)  For wound re-check and routine post operative follow up in [facility] clinic    Call the office if you have: (1) Fevers to more than 101F, (2) Unusual chest or leg pain, (3) Drainage or fluid from incision that may be foul smelling, increased tenderness or soreness at the wound or the wound edges are no longer together, redness or swelling at the incision site. Do not hesitate to call with any other questions.    TIME SPENT ON DISCHARGE: 26 minutes      ____________________________________________  Allison Villanueva P.A.-C.    DD: 12/16/2024 8:25 AM

## 2024-12-16 NOTE — CARE PLAN
The patient is Stable - Low risk of patient condition declining or worsening    Shift Goals  Clinical Goals: pain control, ambulation, monitor I&O  Patient Goals: rest, go home  Family Goals: TIFFANIE    Progress made toward(s) clinical / shift goals:    Problem: Knowledge Deficit - Standard  Goal: Patient and family/care givers will demonstrate understanding of plan of care, disease process/condition, diagnostic tests and medications  Description: Target End Date: 12/16/2024    Document in Patient Education    1.  Patient and family/caregiver oriented to unit, equipment, visitation policy and means for communicating concern  2.  Complete/review Learning Assessment  3.  Assess knowledge level of disease process/condition, treatment plan, diagnostic tests and medications  4.  Explain disease process/condition, treatment plan, diagnostic tests and medications  Outcome: Progressing     Problem: Early Mobilization - Post Surgery  Goal: Early mobilization post surgery  Description: Target End Date: 12/16/2024    1.  Out of bed on post op day 0, unless contraindicated  2.  Ambulate three times a day post-op day one, advance activity as tolerated  3.  Up in chair for meals  4.  Pain management adequate to allow progressive mobilization  5.  Don/doff brace/corset as ordered  Outcome: Progressing     Problem: Pain - Post Surgery  Goal: Alleviation or reduction of pain post surgery  Description: Target End Date: 12/16/2024    1.  Pain assessed q2 hours for 24 hours, then q4 hours during use of PCA  2.  Transition to oral medications as appropriate    Outcome: Progressing

## 2024-12-17 LAB
BACTERIA UR CULT: ABNORMAL
SIGNIFICANT IND 70042: ABNORMAL
SITE SITE: ABNORMAL
SOURCE SOURCE: ABNORMAL

## 2024-12-26 NOTE — PROGRESS NOTES
"    HISTORY OF PRESENT ILLNESS: The patient is a 44 year-old woman who returns to the [facility] for routine follow-up after undergoing a laparoscopic cholecystectomy for acute cholecystitis by Ronan Giraldo MD on 12/15/2024. Since surgery, she has been feeling well. Tolerating diet and normal bowel function.     CURRENT MEDICATIONS:  Current Outpatient Medications   Medication Sig    acetaminophen (TYLENOL) 500 MG Tab Take 1-2 Tablets by mouth every 6 hours as needed for Moderate Pain or Mild Pain.    dicyclomine (BENTYL) 20 MG Tab Take 1 Tablet by mouth every 6 hours.    ondansetron (ZOFRAN ODT) 4 MG TABLET DISPERSIBLE Take 1 Tablet by mouth every 6 hours as needed for Nausea/Vomiting.       PHYSICAL EXAMINATION:  Vital Signs: /72   Pulse 86   Resp 18   Ht 1.626 m (5' 4\")   Wt 59 kg (130 lb)   SpO2 100%   Physical Exam  Vitals and nursing note reviewed.   Constitutional:       General: She is not in acute distress.  Abdominal:      General: There is no distension.      Palpations: Abdomen is soft.      Tenderness: There is no abdominal tenderness. There is no guarding.      Comments: Port sites well healing and approximated    Neurological:      Mental Status: She is alert.         LABORATORY VALUES:  Lab Results   Component Value Date/Time    WBC 10.0 12/16/2024 05:31 AM    RBC 4.58 12/16/2024 05:31 AM    HEMOGLOBIN 13.2 12/16/2024 05:31 AM    HEMATOCRIT 39.1 12/16/2024 05:31 AM    MCV 85.4 12/16/2024 05:31 AM    MCH 28.8 12/16/2024 05:31 AM    MCHC 33.8 12/16/2024 05:31 AM    MPV 11.2 12/16/2024 05:31 AM    NEUTSPOLYS 77.70 (H) 12/16/2024 05:31 AM    LYMPHOCYTES 16.20 (L) 12/16/2024 05:31 AM    MONOCYTES 5.50 12/16/2024 05:31 AM    EOSINOPHILS 0.00 12/16/2024 05:31 AM    BASOPHILS 0.30 12/16/2024 05:31 AM     Lab Results   Component Value Date/Time    SODIUM 139 12/16/2024 05:31 AM    POTASSIUM 4.1 12/16/2024 05:31 AM    CHLORIDE 104 12/16/2024 05:31 AM    CO2 24 12/16/2024 " "05:31 AM    GLUCOSE 112 (H) 12/16/2024 05:31 AM    BUN 13 12/16/2024 05:31 AM    CREATININE 0.74 12/16/2024 05:31 AM     No results found for: \"PROTHROMBTM\", \"INR\"    IMAGING:  No orders to display       PATHOLOGY: Final pathology demonstrated   Gallbladder: Acute cholecystitis, cholelithiasis    ASSESSMENT AND PLAN:  Satisfactory progress following a laparoscopic cholecystectomy.  Final postoperative instructions provided. Lifting restrictions reviewed. Discharge from the [facility] [facility]. Additional 2 weeks off from work, return 1/10/2024.       ____________________________________     Allison Villanueva P.A.-C.    DD: 12/27/2024  1:12 PM    "

## 2024-12-27 ENCOUNTER — OFFICE VISIT (OUTPATIENT)
Dept: SURGERY | Facility: MEDICAL CENTER | Age: 44
End: 2024-12-27
Attending: STUDENT IN AN ORGANIZED HEALTH CARE EDUCATION/TRAINING PROGRAM

## 2024-12-27 VITALS
RESPIRATION RATE: 18 BRPM | HEIGHT: 64 IN | HEART RATE: 86 BPM | BODY MASS INDEX: 22.2 KG/M2 | OXYGEN SATURATION: 100 % | DIASTOLIC BLOOD PRESSURE: 72 MMHG | WEIGHT: 130 LBS | SYSTOLIC BLOOD PRESSURE: 114 MMHG

## 2024-12-27 DIAGNOSIS — Z90.49 STATUS POST LAPAROSCOPIC CHOLECYSTECTOMY: ICD-10-CM

## 2024-12-27 PROCEDURE — 99024 POSTOP FOLLOW-UP VISIT: CPT | Performed by: PHYSICIAN ASSISTANT

## 2024-12-27 ASSESSMENT — FIBROSIS 4 INDEX: FIB4 SCORE: 2.12

## 2024-12-27 NOTE — LETTER
Name: Katina Rider   : 1980    Date: 2024      To Whom it May Concern,       Please excuse Katina from work until 1/10/2024.   Please call [facility] for questions or concerns.         Thanks,   Allison Villanueva PA-C  Electronically signed

## 2025-01-02 ENCOUNTER — APPOINTMENT (OUTPATIENT)
Dept: RADIOLOGY | Facility: MEDICAL CENTER | Age: 45
DRG: 390 | End: 2025-01-02
Attending: EMERGENCY MEDICINE
Payer: COMMERCIAL

## 2025-01-02 ENCOUNTER — APPOINTMENT (OUTPATIENT)
Dept: RADIOLOGY | Facility: MEDICAL CENTER | Age: 45
DRG: 390 | End: 2025-01-02
Attending: STUDENT IN AN ORGANIZED HEALTH CARE EDUCATION/TRAINING PROGRAM
Payer: COMMERCIAL

## 2025-01-02 ENCOUNTER — HOSPITAL ENCOUNTER (INPATIENT)
Facility: MEDICAL CENTER | Age: 45
LOS: 2 days | DRG: 390 | End: 2025-01-04
Attending: EMERGENCY MEDICINE | Admitting: STUDENT IN AN ORGANIZED HEALTH CARE EDUCATION/TRAINING PROGRAM
Payer: COMMERCIAL

## 2025-01-02 DIAGNOSIS — K56.609 SBO (SMALL BOWEL OBSTRUCTION) (HCC): ICD-10-CM

## 2025-01-02 DIAGNOSIS — R10.84 GENERALIZED ABDOMINAL PAIN: ICD-10-CM

## 2025-01-02 LAB
ALBUMIN SERPL BCP-MCNC: 4.8 G/DL (ref 3.2–4.9)
ALBUMIN/GLOB SERPL: 1.3 G/DL
ALP SERPL-CCNC: 100 U/L (ref 30–99)
ALT SERPL-CCNC: 13 U/L (ref 2–50)
ANION GAP SERPL CALC-SCNC: 14 MMOL/L (ref 7–16)
APPEARANCE UR: CLEAR
AST SERPL-CCNC: 19 U/L (ref 12–45)
BACTERIA #/AREA URNS HPF: ABNORMAL /HPF
BASOPHILS # BLD AUTO: 0.9 % (ref 0–1.8)
BASOPHILS # BLD: 0.06 K/UL (ref 0–0.12)
BILIRUB SERPL-MCNC: 0.4 MG/DL (ref 0.1–1.5)
BILIRUB UR QL STRIP.AUTO: NEGATIVE
BUN SERPL-MCNC: 18 MG/DL (ref 8–22)
CALCIUM ALBUM COR SERPL-MCNC: 10.2 MG/DL (ref 8.5–10.5)
CALCIUM SERPL-MCNC: 10.8 MG/DL (ref 8.5–10.5)
CASTS URNS QL MICRO: ABNORMAL /LPF (ref 0–2)
CHLORIDE SERPL-SCNC: 100 MMOL/L (ref 96–112)
CO2 SERPL-SCNC: 25 MMOL/L (ref 20–33)
COLOR UR: ABNORMAL
CREAT SERPL-MCNC: 0.78 MG/DL (ref 0.5–1.4)
EOSINOPHIL # BLD AUTO: 0.03 K/UL (ref 0–0.51)
EOSINOPHIL NFR BLD: 0.4 % (ref 0–6.9)
EPITHELIAL CELLS 1715: ABNORMAL /HPF (ref 0–5)
ERYTHROCYTE [DISTWIDTH] IN BLOOD BY AUTOMATED COUNT: 40.8 FL (ref 35.9–50)
GFR SERPLBLD CREATININE-BSD FMLA CKD-EPI: 96 ML/MIN/1.73 M 2
GLOBULIN SER CALC-MCNC: 3.8 G/DL (ref 1.9–3.5)
GLUCOSE SERPL-MCNC: 130 MG/DL (ref 65–99)
GLUCOSE UR STRIP.AUTO-MCNC: NEGATIVE MG/DL
HCG SERPL QL: NEGATIVE
HCT VFR BLD AUTO: 45.2 % (ref 37–47)
HGB BLD-MCNC: 15.5 G/DL (ref 12–16)
IMM GRANULOCYTES # BLD AUTO: 0.01 K/UL (ref 0–0.11)
IMM GRANULOCYTES NFR BLD AUTO: 0.1 % (ref 0–0.9)
KETONES UR STRIP.AUTO-MCNC: ABNORMAL MG/DL
LEUKOCYTE ESTERASE UR QL STRIP.AUTO: ABNORMAL
LIPASE SERPL-CCNC: 33 U/L (ref 11–82)
LYMPHOCYTES # BLD AUTO: 1.62 K/UL (ref 1–4.8)
LYMPHOCYTES NFR BLD: 23.5 % (ref 22–41)
MCH RBC QN AUTO: 28.6 PG (ref 27–33)
MCHC RBC AUTO-ENTMCNC: 34.3 G/DL (ref 32.2–35.5)
MCV RBC AUTO: 83.4 FL (ref 81.4–97.8)
MICRO URNS: ABNORMAL
MONOCYTES # BLD AUTO: 0.37 K/UL (ref 0–0.85)
MONOCYTES NFR BLD AUTO: 5.4 % (ref 0–13.4)
MUCOUS THREADS URNS QL MICRO: PRESENT /HPF
NEUTROPHILS # BLD AUTO: 4.81 K/UL (ref 1.82–7.42)
NEUTROPHILS NFR BLD: 69.7 % (ref 44–72)
NITRITE UR QL STRIP.AUTO: NEGATIVE
NRBC # BLD AUTO: 0 K/UL
NRBC BLD-RTO: 0 /100 WBC (ref 0–0.2)
PH UR STRIP.AUTO: 6 [PH] (ref 5–8)
PLATELET # BLD AUTO: 425 K/UL (ref 164–446)
PMV BLD AUTO: 10.5 FL (ref 9–12.9)
POTASSIUM SERPL-SCNC: 4.6 MMOL/L (ref 3.6–5.5)
PROT SERPL-MCNC: 8.6 G/DL (ref 6–8.2)
PROT UR QL STRIP: 100 MG/DL
RBC # BLD AUTO: 5.42 M/UL (ref 4.2–5.4)
RBC # URNS HPF: ABNORMAL /HPF (ref 0–2)
RBC UR QL AUTO: ABNORMAL
SODIUM SERPL-SCNC: 139 MMOL/L (ref 135–145)
SP GR UR STRIP.AUTO: 1.04
UROBILINOGEN UR STRIP.AUTO-MCNC: 1 EU/DL
WBC # BLD AUTO: 6.9 K/UL (ref 4.8–10.8)
WBC #/AREA URNS HPF: ABNORMAL /HPF

## 2025-01-02 PROCEDURE — 304538 HCHG NG TUBE

## 2025-01-02 PROCEDURE — 96375 TX/PRO/DX INJ NEW DRUG ADDON: CPT

## 2025-01-02 PROCEDURE — 700111 HCHG RX REV CODE 636 W/ 250 OVERRIDE (IP): Mod: JZ | Performed by: STUDENT IN AN ORGANIZED HEALTH CARE EDUCATION/TRAINING PROGRAM

## 2025-01-02 PROCEDURE — 80053 COMPREHEN METABOLIC PANEL: CPT

## 2025-01-02 PROCEDURE — 74177 CT ABD & PELVIS W/CONTRAST: CPT

## 2025-01-02 PROCEDURE — 96374 THER/PROPH/DIAG INJ IV PUSH: CPT

## 2025-01-02 PROCEDURE — 700102 HCHG RX REV CODE 250 W/ 637 OVERRIDE(OP): Performed by: STUDENT IN AN ORGANIZED HEALTH CARE EDUCATION/TRAINING PROGRAM

## 2025-01-02 PROCEDURE — 700101 HCHG RX REV CODE 250: Performed by: EMERGENCY MEDICINE

## 2025-01-02 PROCEDURE — 83690 ASSAY OF LIPASE: CPT

## 2025-01-02 PROCEDURE — 99285 EMERGENCY DEPT VISIT HI MDM: CPT

## 2025-01-02 PROCEDURE — 84703 CHORIONIC GONADOTROPIN ASSAY: CPT

## 2025-01-02 PROCEDURE — 85025 COMPLETE CBC W/AUTO DIFF WBC: CPT

## 2025-01-02 PROCEDURE — 99221 1ST HOSP IP/OBS SF/LOW 40: CPT | Performed by: STUDENT IN AN ORGANIZED HEALTH CARE EDUCATION/TRAINING PROGRAM

## 2025-01-02 PROCEDURE — A9270 NON-COVERED ITEM OR SERVICE: HCPCS | Performed by: STUDENT IN AN ORGANIZED HEALTH CARE EDUCATION/TRAINING PROGRAM

## 2025-01-02 PROCEDURE — 87186 SC STD MICRODIL/AGAR DIL: CPT

## 2025-01-02 PROCEDURE — 770006 HCHG ROOM/CARE - MED/SURG/GYN SEMI*

## 2025-01-02 PROCEDURE — 81001 URINALYSIS AUTO W/SCOPE: CPT

## 2025-01-02 PROCEDURE — 99222 1ST HOSP IP/OBS MODERATE 55: CPT | Performed by: STUDENT IN AN ORGANIZED HEALTH CARE EDUCATION/TRAINING PROGRAM

## 2025-01-02 PROCEDURE — 700111 HCHG RX REV CODE 636 W/ 250 OVERRIDE (IP): Mod: JZ | Performed by: EMERGENCY MEDICINE

## 2025-01-02 PROCEDURE — 0DH67UZ INSERTION OF FEEDING DEVICE INTO STOMACH, VIA NATURAL OR ARTIFICIAL OPENING: ICD-10-PCS | Performed by: STUDENT IN AN ORGANIZED HEALTH CARE EDUCATION/TRAINING PROGRAM

## 2025-01-02 PROCEDURE — 87077 CULTURE AEROBIC IDENTIFY: CPT

## 2025-01-02 PROCEDURE — 36415 COLL VENOUS BLD VENIPUNCTURE: CPT

## 2025-01-02 PROCEDURE — 87086 URINE CULTURE/COLONY COUNT: CPT

## 2025-01-02 PROCEDURE — 700117 HCHG RX CONTRAST REV CODE 255: Performed by: EMERGENCY MEDICINE

## 2025-01-02 RX ORDER — MIDAZOLAM HYDROCHLORIDE 1 MG/ML
2 INJECTION INTRAMUSCULAR; INTRAVENOUS ONCE
Status: COMPLETED | OUTPATIENT
Start: 2025-01-02 | End: 2025-01-02

## 2025-01-02 RX ORDER — ONDANSETRON 2 MG/ML
4 INJECTION INTRAMUSCULAR; INTRAVENOUS ONCE
Status: COMPLETED | OUTPATIENT
Start: 2025-01-02 | End: 2025-01-02

## 2025-01-02 RX ORDER — KETOROLAC TROMETHAMINE 15 MG/ML
15 INJECTION, SOLUTION INTRAMUSCULAR; INTRAVENOUS EVERY 6 HOURS PRN
Status: DISCONTINUED | OUTPATIENT
Start: 2025-01-02 | End: 2025-01-04 | Stop reason: HOSPADM

## 2025-01-02 RX ORDER — ONDANSETRON 4 MG/1
4 TABLET, ORALLY DISINTEGRATING ORAL EVERY 4 HOURS PRN
Status: DISCONTINUED | OUTPATIENT
Start: 2025-01-02 | End: 2025-01-04 | Stop reason: HOSPADM

## 2025-01-02 RX ORDER — MORPHINE SULFATE 4 MG/ML
4 INJECTION INTRAVENOUS ONCE
Status: COMPLETED | OUTPATIENT
Start: 2025-01-02 | End: 2025-01-02

## 2025-01-02 RX ORDER — PROMETHAZINE HYDROCHLORIDE 25 MG/1
25 SUPPOSITORY RECTAL EVERY 4 HOURS PRN
Status: DISCONTINUED | OUTPATIENT
Start: 2025-01-02 | End: 2025-01-04 | Stop reason: HOSPADM

## 2025-01-02 RX ORDER — ONDANSETRON 4 MG/1
4 TABLET, ORALLY DISINTEGRATING ORAL EVERY 4 HOURS PRN
Status: DISCONTINUED | OUTPATIENT
Start: 2025-01-02 | End: 2025-01-02

## 2025-01-02 RX ORDER — PROCHLORPERAZINE EDISYLATE 5 MG/ML
10 INJECTION INTRAMUSCULAR; INTRAVENOUS EVERY 6 HOURS PRN
Status: DISCONTINUED | OUTPATIENT
Start: 2025-01-02 | End: 2025-01-04 | Stop reason: HOSPADM

## 2025-01-02 RX ORDER — ONDANSETRON 2 MG/ML
4 INJECTION INTRAMUSCULAR; INTRAVENOUS EVERY 4 HOURS PRN
Status: DISCONTINUED | OUTPATIENT
Start: 2025-01-02 | End: 2025-01-04 | Stop reason: HOSPADM

## 2025-01-02 RX ORDER — LIDOCAINE HYDROCHLORIDE 20 MG/ML
JELLY TOPICAL ONCE
Status: COMPLETED | OUTPATIENT
Start: 2025-01-02 | End: 2025-01-02

## 2025-01-02 RX ADMIN — PHENOL 1 SPRAY: 1.5 LIQUID ORAL at 16:43

## 2025-01-02 RX ADMIN — ONDANSETRON 4 MG: 2 INJECTION INTRAMUSCULAR; INTRAVENOUS at 13:05

## 2025-01-02 RX ADMIN — MORPHINE SULFATE 4 MG: 4 INJECTION, SOLUTION INTRAMUSCULAR; INTRAVENOUS at 13:04

## 2025-01-02 RX ADMIN — LIDOCAINE HYDROCHLORIDE 1 PACKET: 20 JELLY TOPICAL at 15:06

## 2025-01-02 RX ADMIN — MIDAZOLAM HYDROCHLORIDE 2 MG: 1 INJECTION, SOLUTION INTRAMUSCULAR; INTRAVENOUS at 14:52

## 2025-01-02 RX ADMIN — IOHEXOL 100 ML: 350 INJECTION, SOLUTION INTRAVENOUS at 13:54

## 2025-01-02 RX ADMIN — KETOROLAC TROMETHAMINE 15 MG: 15 INJECTION, SOLUTION INTRAMUSCULAR; INTRAVENOUS at 18:36

## 2025-01-02 ASSESSMENT — PAIN DESCRIPTION - PAIN TYPE
TYPE: ACUTE PAIN

## 2025-01-02 ASSESSMENT — FIBROSIS 4 INDEX
FIB4 SCORE: 2.12
FIB4 SCORE: 0.55

## 2025-01-02 ASSESSMENT — ENCOUNTER SYMPTOMS
NERVOUS/ANXIOUS: 0
SORE THROAT: 1
BLOOD IN STOOL: 0
HEADACHES: 0
NECK PAIN: 0
FLANK PAIN: 0
SHORTNESS OF BREATH: 0
NAUSEA: 1
FALLS: 0
FEVER: 0
CONSTIPATION: 0
SINUS PAIN: 0
DIARRHEA: 0
BRUISES/BLEEDS EASILY: 0
ABDOMINAL PAIN: 1
MYALGIAS: 0
VOMITING: 1
DIZZINESS: 1
BACK PAIN: 0
DEPRESSION: 0
CHILLS: 0

## 2025-01-02 NOTE — ED NOTES
Pt ambulated from the [facility] to [place] [id number] by W/C. Pt attached to SPO2, cardiac and BP monitoring. Pt charted up for ERP.

## 2025-01-02 NOTE — H&P
Hospital Medicine History & Physical Note    Date of Service  1/2/2025    Primary Care Physician  Pcp Pt States None    Consultants  general surgery    Specialist Names: Dr. Rea    Code Status  Full Code    Chief Complaint  Chief Complaint   Patient presents with    Post-Op Complications     Pt had cholecystectomy 2 weeks ago on 12/15/24 and was recovering fine. But woke up last night with severe abdominal pain.        History of Presenting Illness  Katina Rider is a 44 y.o. female who presented 1/2/2025 with small bowel obstruction.    She underwent laparoscopic cholecystectomy 12/15/24 by Dr. Giraldo. She reports she developed nausea and abdominal discomfort yesterday. She was able to eat. This morning she had worsening abdominal pain and N/V for which she could only keep down small amounts of liquid. She felt dizzy but denies syncope, fall. She denies bowel/bladder dysfunction, F/C, bleeding, other pain, dysphoria. She has a sore throat from NG placement.    She presented with RR 26. CBC normal. CMP normal. Lipase normal. HCGH normal. UA positive for ketones. CT A/P demonstrated SBO with lower pelvic transition point. NG was placed to suction.    POC discussed with SGTUTU Rea. Nonoperative management with NGT for now.    I discussed the plan of care with patient, bedside RN, and general surgery and ED provider .    Review of Systems  Review of Systems   Constitutional:  Negative for chills, fever and malaise/fatigue.   HENT:  Positive for sore throat. Negative for ear pain, nosebleeds and sinus pain.    Respiratory:  Negative for shortness of breath.    Cardiovascular:  Negative for chest pain.   Gastrointestinal:  Positive for abdominal pain, nausea and vomiting. Negative for blood in stool, constipation, diarrhea and melena.   Genitourinary:  Negative for dysuria, flank pain, frequency, hematuria and urgency.   Musculoskeletal:  Negative for back pain, falls, joint pain, myalgias and neck  pain.   Neurological:  Positive for dizziness. Negative for headaches.   Endo/Heme/Allergies:  Does not bruise/bleed easily.   Psychiatric/Behavioral:  Negative for depression. The patient is not nervous/anxious.        Past Medical History   has no past medical history on file.    Surgical History   has a past surgical history that includes lorena by laparoscopy (12/15/2024).     Family History  family history includes Hypertension in her mother.   Family history reviewed with patient. There is no family history that is pertinent to the chief complaint.     Social History   reports that she has never smoked. She has never used smokeless tobacco. She reports that she does not drink alcohol and does not use drugs.    Allergies  No Known Allergies    Medications  Prior to Admission Medications   Prescriptions Last Dose Informant Patient Reported? Taking?   acetaminophen (TYLENOL) 500 MG Tab 1/2/2025 at  9:00 AM Patient No Yes   Sig: Take 1-2 Tablets by mouth every 6 hours as needed for Moderate Pain or Mild Pain.   ondansetron (ZOFRAN ODT) 4 MG TABLET DISPERSIBLE 1/1/2025 at 10:00 PM Patient No Yes   Sig: Take 1 Tablet by mouth every 6 hours as needed for Nausea/Vomiting.      Facility-Administered Medications: None       Physical Exam  Temp:  [36.8 °C (98.3 °F)] 36.8 °C (98.3 °F)  Pulse:  [79-99] 99  Resp:  [15-26] 20  BP: (136-146)/(68-96) 146/93  SpO2:  [92 %-99 %] 94 %  Blood Pressure: (!) 146/93   Temperature: 36.8 °C (98.3 °F)   Pulse: 99   Respiration: 20   Pulse Oximetry: 94 %       Physical Exam  Vitals and nursing note reviewed.   Constitutional:       General: She is awake. She is not in acute distress.     Appearance: She is ill-appearing (Acutely). She is not toxic-appearing or diaphoretic.   HENT:      Head: Normocephalic.      Nose:      Comments: NGT, bilious output     Mouth/Throat:      Mouth: Mucous membranes are dry.   Eyes:      General: No scleral icterus.     Conjunctiva/sclera: Conjunctivae  "normal.   Cardiovascular:      Rate and Rhythm: Normal rate and regular rhythm.      Pulses: Normal pulses.      Heart sounds: Normal heart sounds. No murmur heard.     No friction rub. No gallop.   Pulmonary:      Effort: Pulmonary effort is normal. No respiratory distress.      Breath sounds: Normal breath sounds. No wheezing, rhonchi or rales.   Abdominal:      General: Abdomen is flat. Bowel sounds are normal. There is no distension.      Palpations: Abdomen is soft.      Tenderness: There is no abdominal tenderness. There is no guarding or rebound.   Genitourinary:     Comments: No hernandez  Musculoskeletal:      Cervical back: Neck supple.      Right lower leg: No edema.      Left lower leg: No edema.   Skin:     General: Skin is warm.   Neurological:      Comments: Appropriately conversant, moves all extremities   Psychiatric:         Attention and Perception: Attention normal.         Mood and Affect: Mood is depressed. Affect is blunt.         Speech: Speech is delayed.         Behavior: Behavior is slowed and withdrawn. Behavior is cooperative.         Thought Content: Thought content normal.         Cognition and Memory: Cognition and memory normal.         Judgment: Judgment normal.         Laboratory:  Recent Labs     01/02/25  1103   WBC 6.9   RBC 5.42*   HEMOGLOBIN 15.5   HEMATOCRIT 45.2   MCV 83.4   MCH 28.6   MCHC 34.3   RDW 40.8   PLATELETCT 425   MPV 10.5     Recent Labs     01/02/25  1103   SODIUM 139   POTASSIUM 4.6   CHLORIDE 100   CO2 25   GLUCOSE 130*   BUN 18   CREATININE 0.78   CALCIUM 10.8*     Recent Labs     01/02/25  1103   ALTSGPT 13   ASTSGOT 19   ALKPHOSPHAT 100*   TBILIRUBIN 0.4   LIPASE 33   GLUCOSE 130*         No results for input(s): \"NTPROBNP\" in the last 72 hours.      No results for input(s): \"TROPONINT\" in the last 72 hours.    Imaging:  CT-ABDOMEN-PELVIS WITH   Final Result         1. The patient has undergone interval cholecystectomy.   2. Findings are consistent with a small " bowel obstruction.          no X-Ray or EKG requiring interpretation    Assessment/Plan:  Justification for Admission Status  I anticipate this patient will require at least two midnights for appropriate medical management, necessitating inpatient admission because small bowel obstruction with transition point, conservative management for 48h for spontaneous recovery prior to probable small bowel transit study.    Patient will need a Med/Surg bed on MEDICAL service .  The need is secondary to nasogastric decompression, serial abdominal examinations, reassessment of surgical intervention.    * Small bowel obstruction (HCC)- (present on admission)  Assessment & Plan  Unclear etiology  CT demonstrates dilated distal bowel with possible transition point in pelvis  Surgery consulted, nonoperative management for now  NGT to LIS  NPO except sips / ice chips  Toradol PRN pain  Antiemetics  Avoid opiates due to risk of reduced bowel transit  Repeat AM BMP/Mg      VTE prophylaxis: SCDs/TEDs and pharmacologic prophylaxis contraindicated due to low risk

## 2025-01-02 NOTE — ED TRIAGE NOTES
Chief Complaint   Patient presents with    Post-Op Complications     Pt had cholecystectomy 2 weeks ago on 12/15/24 and was recovering fine. But woke up last night with severe abdominal pain.      PT reports 10/10 abdominal pain.  Denies vomiting, ;last BM this morning at 0800.  Last dose of tylenol for pain at 0900 this morning. Laparoscopic incisions appear within defined limits, no redness or s/s of infection.    Sepsis screening =2, abdominal pain protocol initiated.

## 2025-01-02 NOTE — CONSULTS
DATE OF CONSULTATION:  1/2/2025     REFERRING PHYSICIAN:   Tawanda Winston M.D.     CONSULTING PHYSICIAN:  Jessica Rea M.D.     REASON FOR CONSULTATION:  I have been asked by Dr. Winston to see the patient in surgical consultation for evaluation of abdominal pain.    HISTORY OF PRESENT ILLNESS: The patient is a 44 year-old  woman who presents to the Emergency Department with abdominal pain for the past day.  The patient had a laparoscopic cholecystectomy on 12/15/2024, which was uncomplicated.  She did have an intrahepatic gallbladder with a small amount of bile spilled, but was discharged in stable condition the following day.  She followed up in general surgery clinic on 12/27/2020 for and had no complaints and was recovering appropriately.  Yesterday, the patient developed severe diffuse abdominal pain.  She has been able to pass flatus and have bowel movements as recently as today.  She denies any fevers, chills.  CT imaging performed by the emergency department provider revealed a small bowel obstruction with transition point in the pelvis and proximal dilated small bowel loops.  There is no evidence of clinically significant fluid collection in the gallbladder fossa.    PAST MEDICAL HISTORY:  has no past medical history on file.    PAST SURGICAL HISTORY:  has a past surgical history that includes lorena by laparoscopy (12/15/2024).    ALLERGIES: No Known Allergies    CURRENT MEDICATIONS:    Home Medications       Reviewed by Dilshad Yan (Pharmacy Tech) on 01/02/25 at 1439  Med List Status: Complete     Medication Last Dose Status   acetaminophen (TYLENOL) 500 MG Tab 1/2/2025 Active   ondansetron (ZOFRAN ODT) 4 MG TABLET DISPERSIBLE 1/1/2025 Active                  Audit from Redirected Encounters    **Home medications have not yet been reviewed for this encounter**         FAMILY HISTORY: family history is not on file.    SOCIAL HISTORY:  reports that she has never smoked. She has never  used smokeless tobacco. She reports that she does not drink alcohol and does not use drugs.    REVIEW OF SYSTEMS: Comprehensive review of systems is negative with the exception of the aforementioned HPI, PMH, and PSH bullets in accordance with CMS guidelines.    PHYSICAL EXAMINATION:    CONSTITUTIONAL: Alert, awake, oriented x3. Moderate distress secondary to pain.  HEENT: Normocephalic, atraumatic. PERRL. Conjunctivae normal.  NECK: Supple  CARDIOVASCULAR: Normal rate, regular rhythm.  PULMONARY/CHEST: No respiratory distress. Chest wall stable, non-tender, normal excursion.  ABDOMEN: Soft, non-distended, diffuse, nonspecific tenderness to palpation. No rigidity or guarding, no peritoneal signs. Well healed laparoscopic surgical incisions.  MUSCULOSKELETAL: Moving all extremities.  NEUROLOGICAL: CNII-XII grossly intact, no focal deficits.  SKIN: Warm and dry. No abrasions, lacerations.  PSYCHIATRIC: Behavior appropriate for situation.    LABORATORY VALUES:   Recent Labs     01/02/25  1103   WBC 6.9   RBC 5.42*   HEMOGLOBIN 15.5   HEMATOCRIT 45.2   MCV 83.4   MCH 28.6   MCHC 34.3   RDW 40.8   PLATELETCT 425   MPV 10.5     Recent Labs     01/02/25  1103   SODIUM 139   POTASSIUM 4.6   CHLORIDE 100   CO2 25   GLUCOSE 130*   BUN 18   CREATININE 0.78   CALCIUM 10.8*     Recent Labs     01/02/25  1103   ASTSGOT 19   ALTSGPT 13   TBILIRUBIN 0.4   ALKPHOSPHAT 100*   GLOBULIN 3.8*            IMAGING:   CT-ABDOMEN-PELVIS WITH   Final Result         1. The patient has undergone interval cholecystectomy.   2. Findings are consistent with a small bowel obstruction.          ASSESSMENT AND PLAN:     This is a 44 year old woman presenting with a small bowel obstruction.    * Small bowel obstruction (HCC)- (present on admission)  Assessment & Plan  Transition point in the pelvis, unrelated to recent laparoscopic cholecystectomy  NPO, NG tube to LWS  Conservative management  Replenish electrolytes  Mobilize        DISPOSITION:  Medical evaluation and admission for SBO management. [facility] will follow.     ____________________________________     Jessica Rea M.D.    DD: 1/2/2025  3:15 PM    AAST Grading System for EGS Conditions  ACS NSQIP Surgical Risk Calculator

## 2025-01-02 NOTE — ED NOTES
Med Rec completed per patient with  (Courtney [id number])   Allergies reviewed    Patient is not taking anticoagulants

## 2025-01-02 NOTE — ED PROVIDER NOTES
ER Provider Note    Scribed for Tawanda Winston M.d. by Lia Markham. 1/2/2025  12:56 PM    Primary Care Provider: Pcp Pt States None    CHIEF COMPLAINT  Chief Complaint   Patient presents with    Post-Op Complications     Pt had cholecystectomy 2 weeks ago on 12/15/24 and was recovering fine. But woke up last night with severe abdominal pain.      EXTERNAL RECORDS REVIEWED  Inpatient Notes Patient underwent a complicated laparoscopic cholecystectomy with bile spillage due to complex anatomy on 12/15/14 with Dr. Giraldo.    HPI/ROS  LIMITATION TO HISTORY   Select: : None    OUTSIDE HISTORIAN(S):  None    Katina Rider is a 44 y.o. female who presents to the ED complaining of abdominal pain onset yesterday. The patient states she underwent a cholecystectomy with Dr. Giraldo two weeks ago and was having a normal recovery until she suddenly began experiencing generalized abdominal pain last night. She reports her pain is exacerbated by pressure; There are no known alleviating factors. She notes her pain is not necessarily exacerbated by eating, however she was unable to tolerate food yesterday and today. The patient endorses additional nausea and vomiting but denies diarrhea, fever, dysuria, or constipation. She has no allergies to medications.    PAST MEDICAL HISTORY  History reviewed. No pertinent past medical history.    SURGICAL HISTORY  Past Surgical History:   Procedure Laterality Date    HARRIETT BY LAPAROSCOPY  12/15/2024    Procedure: CHOLECYSTECTOMY, LAPAROSCOPIC;  Surgeon: Ronan Giraldo M.D.;  Location: SURGERY [facility];  Service: Thoracic       FAMILY HISTORY  Family History   Problem Relation Age of Onset    Hypertension Mother        SOCIAL HISTORY   reports that she has never smoked. She has never used smokeless tobacco. She reports that she does not drink alcohol and does not use drugs.    CURRENT MEDICATIONS  Previous Medications    ACETAMINOPHEN (TYLENOL) 500 MG TAB    Take 1-2  Tablets by mouth every 6 hours as needed for Moderate Pain or Mild Pain.    DICYCLOMINE (BENTYL) 20 MG TAB    Take 1 Tablet by mouth every 6 hours.    ONDANSETRON (ZOFRAN ODT) 4 MG TABLET DISPERSIBLE    Take 1 Tablet by mouth every 6 hours as needed for Nausea/Vomiting.       ALLERGIES  Patient has no known allergies.    PHYSICAL EXAM  VITAL SIGNS: BP (!) 141/92   Pulse 83   Temp 36.8 °C (98.3 °F) (Temporal)   Resp (!) 26   Wt 59 kg (130 lb)   SpO2 99%   BMI 22.31 kg/m²   Pulse ox interpretation: I interpret this pulse ox as normal.  Constitutional: Alert in obvious distress.  HENT: No signs of trauma, Bilateral external ears normal, Nose normal.   Eyes: Conjunctiva normal, Non-icteric.   Neck: Normal range of motion, Supple, No stridor.   Lymphatic: No lymphadenopathy noted.   Cardiovascular: Regular rate and rhythm, no murmurs.   Thorax & Lungs: Normal breath sounds, No respiratory distress, No wheezing  Abdomen: Diffuse abdominal tenderness with guarding. Bowel sounds normal, Soft, No masses, No pulsatile masses. No peritoneal signs.   Skin: Warm, Dry, No erythema, No rash.   Back: No midline bony tenderness.   Extremities: Intact distal pulses, No edema, No cyanosis.  Musculoskeletal: Good range of motion in all major joints. No or major deformities noted.   Neurologic: Alert , Normal motor function, Normal sensory function, No focal deficits noted.   Psychiatric: Affect normal, Judgment normal, Mood normal.     DIAGNOSTIC STUDIES    Labs:   Labs Reviewed   CBC WITH DIFFERENTIAL - Abnormal; Notable for the following components:       Result Value    RBC 5.42 (*)     All other components within normal limits   COMP METABOLIC PANEL - Abnormal; Notable for the following components:    Glucose 130 (*)     Calcium 10.8 (*)     Alkaline Phosphatase 100 (*)     Total Protein 8.6 (*)     Globulin 3.8 (*)     All other components within normal limits   URINALYSIS,CULTURE IF INDICATED - Abnormal; Notable for the  following components:    Ketones Trace (*)     Protein 100 (*)     Leukocyte Esterase Small (*)     Occult Blood Trace (*)     All other components within normal limits   URINE MICROSCOPIC (W/UA) - Abnormal; Notable for the following components:    WBC 21-50 (*)     Bacteria Few (*)     All other components within normal limits   LIPASE   HCG QUAL SERUM   ESTIMATED GFR   URINE CULTURE(NEW)     All labs reviewed by me.    Radiology:   The attending emergency physician has independently interpreted the diagnostic imaging associated with this visit and am waiting the final reading from the radiologist.     Preliminary interpretation is a follows: bowel obstruction    Radiologist interpretation:   CT-ABDOMEN-PELVIS WITH   Final Result         1. The patient has undergone interval cholecystectomy.   2. Findings are consistent with a small bowel obstruction.          COURSE & MEDICAL DECISION MAKING     INITIAL ASSESSMENT, COURSE AND PLAN  Care Narrative:     12:56 PM - Patient presents to the ED with abdominal pain onset yesterday after having a complicated laparoscopic cholecystectomy two weeks ago. Per triage protocol, UA w/ microscopic, HCG qual serum, Lipase, CMP, and CBC w/ diff were ordered. Patient evaluated at bedside and discussed plan of care, including medications for pain and nausea and imaging to evaluate. Patient will be treated with morphine 4 mg injection and Zofran 4 mg injection. Ordered for CT-Abdomen-Pelvis w/ to evaluate her symptoms. Differential diagnoses include but not limited to: Bile leak or other post-op complication, bowel obstruction, retained gallstone, pancreatitis, viral stomach illness.    1:56 PM - Labs are unremarkable. CT pending.    2:26 PM - CT is consistent with bowel obstruction. Will page surgery. The patient will be treated with lidocaine 2% jelly, Cetacaine spray, and Versed 2 mg injection.     2:50 PM - I discussed the patient's case and the above findings with Dr. Rea (Gen  Surg) who recommends admission to medicine and agrees to evaluate the patient for surgical consult.    3:25 PM - I discussed the patient's case and the above findings with Dr. Wells (Hospitalist) who agrees to evaluate the patient for hospitalization.       DISPOSITION AND DISCUSSIONS  I have discussed management of the patient with the following physicians and ISRAEL's:  Dr. Rea (Gen Surg), Dr. Wells (Hospitalist)    Discussion of management with other \[facility]\"" or appropriate source(s): None     Barriers to care at this time, including but not limited to: Patient does not have established PCP and Patient does not have insurance.     DISPOSITION:  Patient will be hospitalized by Dr. Wells in guarded condition.     FINAL DIAGNOSIS  1. SBO (small bowel obstruction) (HCC)    2. Generalized abdominal pain        Lia ARNOLD ([employer]rib[employer]), am scribing for, and in the presence of, Tawanda Winston M.D..    Electronically signed by: Lia Markham (Rachael), 1/2/2025    Tawanda ARNOLD M.D. personally performed the services described in this documentation, as scribed by Lia Markham in my presence, and it is both accurate and complete.

## 2025-01-02 NOTE — ASSESSMENT & PLAN NOTE
Transition point in the pelvis, unrelated to recent laparoscopic cholecystectomy  NPO, NG tube to LWS  Conservative management  Replenish electrolytes  Mobilize

## 2025-01-03 ENCOUNTER — APPOINTMENT (OUTPATIENT)
Dept: RADIOLOGY | Facility: MEDICAL CENTER | Age: 45
DRG: 390 | End: 2025-01-03
Payer: COMMERCIAL

## 2025-01-03 PROBLEM — Z90.49 HISTORY OF CHOLECYSTECTOMY: Status: ACTIVE | Noted: 2025-01-03

## 2025-01-03 LAB
ANION GAP SERPL CALC-SCNC: 13 MMOL/L (ref 7–16)
BUN SERPL-MCNC: 21 MG/DL (ref 8–22)
CALCIUM SERPL-MCNC: 10.1 MG/DL (ref 8.5–10.5)
CHLORIDE SERPL-SCNC: 102 MMOL/L (ref 96–112)
CO2 SERPL-SCNC: 25 MMOL/L (ref 20–33)
CREAT SERPL-MCNC: 0.77 MG/DL (ref 0.5–1.4)
GFR SERPLBLD CREATININE-BSD FMLA CKD-EPI: 97 ML/MIN/1.73 M 2
GLUCOSE SERPL-MCNC: 107 MG/DL (ref 65–99)
MAGNESIUM SERPL-MCNC: 2.4 MG/DL (ref 1.5–2.5)
PHOSPHATE SERPL-MCNC: 6.1 MG/DL (ref 2.5–4.5)
POTASSIUM SERPL-SCNC: 4.7 MMOL/L (ref 3.6–5.5)
SODIUM SERPL-SCNC: 140 MMOL/L (ref 135–145)

## 2025-01-03 PROCEDURE — 99232 SBSQ HOSP IP/OBS MODERATE 35: CPT | Mod: 24 | Performed by: STUDENT IN AN ORGANIZED HEALTH CARE EDUCATION/TRAINING PROGRAM

## 2025-01-03 PROCEDURE — 700117 HCHG RX CONTRAST REV CODE 255

## 2025-01-03 PROCEDURE — 700105 HCHG RX REV CODE 258: Performed by: STUDENT IN AN ORGANIZED HEALTH CARE EDUCATION/TRAINING PROGRAM

## 2025-01-03 PROCEDURE — 83735 ASSAY OF MAGNESIUM: CPT

## 2025-01-03 PROCEDURE — 99232 SBSQ HOSP IP/OBS MODERATE 35: CPT | Performed by: STUDENT IN AN ORGANIZED HEALTH CARE EDUCATION/TRAINING PROGRAM

## 2025-01-03 PROCEDURE — 770006 HCHG ROOM/CARE - MED/SURG/GYN SEMI*

## 2025-01-03 PROCEDURE — 80048 BASIC METABOLIC PNL TOTAL CA: CPT

## 2025-01-03 PROCEDURE — 74250 X-RAY XM SM INT 1CNTRST STD: CPT

## 2025-01-03 PROCEDURE — 84100 ASSAY OF PHOSPHORUS: CPT

## 2025-01-03 RX ORDER — ENOXAPARIN SODIUM 100 MG/ML
40 INJECTION SUBCUTANEOUS DAILY
Status: DISCONTINUED | OUTPATIENT
Start: 2025-01-03 | End: 2025-01-04 | Stop reason: HOSPADM

## 2025-01-03 RX ORDER — SODIUM CHLORIDE 9 MG/ML
INJECTION, SOLUTION INTRAVENOUS CONTINUOUS
Status: DISCONTINUED | OUTPATIENT
Start: 2025-01-03 | End: 2025-01-04

## 2025-01-03 RX ADMIN — IOHEXOL 250 ML: 350 INJECTION, SOLUTION INTRAVENOUS at 15:45

## 2025-01-03 RX ADMIN — SODIUM CHLORIDE: 9 INJECTION, SOLUTION INTRAVENOUS at 16:23

## 2025-01-03 SDOH — ECONOMIC STABILITY: TRANSPORTATION INSECURITY
IN THE PAST 12 MONTHS, HAS THE LACK OF TRANSPORTATION KEPT YOU FROM MEDICAL APPOINTMENTS OR FROM GETTING MEDICATIONS?: YES

## 2025-01-03 SDOH — ECONOMIC STABILITY: TRANSPORTATION INSECURITY
IN THE PAST 12 MONTHS, HAS LACK OF RELIABLE TRANSPORTATION KEPT YOU FROM MEDICAL APPOINTMENTS, MEETINGS, WORK OR FROM GETTING THINGS NEEDED FOR DAILY LIVING?: YES

## 2025-01-03 ASSESSMENT — ENCOUNTER SYMPTOMS
ABDOMINAL PAIN: 1
FEVER: 0
SHORTNESS OF BREATH: 0
VOMITING: 0
NAUSEA: 1

## 2025-01-03 ASSESSMENT — SOCIAL DETERMINANTS OF HEALTH (SDOH)

## 2025-01-03 ASSESSMENT — LIFESTYLE VARIABLES
CONSUMPTION TOTAL: POSITIVE
CONSUMPTION TOTAL: INCOMPLETE
HAVE PEOPLE ANNOYED YOU BY CRITICIZING YOUR DRINKING: NO
HAVE PEOPLE ANNOYED YOU BY CRITICIZING YOUR DRINKING: NO
TOTAL SCORE: 0
EVER FELT BAD OR GUILTY ABOUT YOUR DRINKING: NO
TOTAL SCORE: 0
ALCOHOL_USE: NO
HAVE YOU EVER FELT YOU SHOULD CUT DOWN ON YOUR DRINKING: NO
EVER HAD A DRINK FIRST THING IN THE MORNING TO STEADY YOUR NERVES TO GET RID OF A HANGOVER: NO
TOTAL SCORE: 0
ON A TYPICAL DAY WHEN YOU DRINK ALCOHOL HOW MANY DRINKS DO YOU HAVE: 6
EVER FELT BAD OR GUILTY ABOUT YOUR DRINKING: NO
TOTAL SCORE: 0
DOES PATIENT WANT TO STOP DRINKING: NO
TOTAL SCORE: 0
HAVE YOU EVER FELT YOU SHOULD CUT DOWN ON YOUR DRINKING: NO
ALCOHOL_USE: YES
TOTAL SCORE: 0
HOW MANY TIMES IN THE PAST YEAR HAVE YOU HAD 5 OR MORE DRINKS IN A DAY: 3
AVERAGE NUMBER OF DAYS PER WEEK YOU HAVE A DRINK CONTAINING ALCOHOL: 0
EVER HAD A DRINK FIRST THING IN THE MORNING TO STEADY YOUR NERVES TO GET RID OF A HANGOVER: NO

## 2025-01-03 ASSESSMENT — COGNITIVE AND FUNCTIONAL STATUS - GENERAL
SUGGESTED CMS G CODE MODIFIER DAILY ACTIVITY: CH
MOBILITY SCORE: 24
DAILY ACTIVITIY SCORE: 24
SUGGESTED CMS G CODE MODIFIER MOBILITY: CH

## 2025-01-03 ASSESSMENT — PAIN DESCRIPTION - PAIN TYPE
TYPE: ACUTE PAIN
TYPE: ACUTE PAIN

## 2025-01-03 ASSESSMENT — PATIENT HEALTH QUESTIONNAIRE - PHQ9
SUM OF ALL RESPONSES TO PHQ9 QUESTIONS 1 AND 2: 0
2. FEELING DOWN, DEPRESSED, IRRITABLE, OR HOPELESS: NOT AT ALL
1. LITTLE INTEREST OR PLEASURE IN DOING THINGS: NOT AT ALL

## 2025-01-03 NOTE — PROGRESS NOTES
[facility] Medicine Daily Progress Note    Date of Service  1/3/2025    Chief Complaint  Katina Rider is a 44 y.o. female admitted 1/2/2025 with abdominal pain.    Hospital Course  Katina Rider is a 44-year-old female with PMHx cholecystectomy 12/15.  Admitted 1/2 with abdominal pain, nausea and vomiting.    CT A/P: Multiple dilated loops of fluid-filled small bowel measuring up to 4.1 cm in diameter. The terminal ileum is decompressed. Findings are consistent with a small bowel obstruction. Transition appears to be in the midline lower   abdomen/upper pelvis.    General surgery was consulted.  Recommending nonoperative management with NG tube for now.    Interval Problem Update  1/3: Vital stable overnight.  -154.  N.p.o. today.  NG tube in place.  Ice chips at bedside.  Patient endorses flatulence.  No bowel movement.    I have discussed this patient's plan of care and discharge plan at IDT rounds today with Case Management, Nursing, Nursing leadership, and other members of the IDT team.    Consultants/Specialty  general surgery    Code Status  Full Code    Disposition  The patient is not medically cleared for discharge to home or a post-acute facility.  Anticipate discharge to: home with close outpatient follow-up    I have placed the appropriate orders for post-discharge needs.    Review of Systems  Review of Systems   Constitutional:  Negative for fever and malaise/fatigue.   Respiratory:  Negative for shortness of breath.    Cardiovascular:  Negative for chest pain and leg swelling.   Gastrointestinal:  Positive for abdominal pain and nausea. Negative for vomiting.        Physical Exam  Temp:  [36 °C (96.8 °F)-36.3 °C (97.4 °F)] 36.3 °C (97.3 °F)  Pulse:  [69-99] 80  Resp:  [16-20] 17  BP: (134-158)/(83-99) 134/99  SpO2:  [94 %-96 %] 96 %    Physical Exam  Vitals and nursing note reviewed.   Constitutional:       General: She is not in acute distress.     Appearance: Normal appearance. She  is ill-appearing.   Cardiovascular:      Rate and Rhythm: Normal rate and regular rhythm.   Pulmonary:      Effort: Pulmonary effort is normal.      Breath sounds: Normal breath sounds.   Abdominal:      General: There is no distension.      Tenderness: There is abdominal tenderness.      Comments: Moderate tenderness to palpation.  Hypoactive bowel sounds throughout   Skin:     General: Skin is warm and dry.   Neurological:      Mental Status: She is alert and oriented to person, place, and time.   Psychiatric:         Mood and Affect: Mood normal.         Behavior: Behavior normal.         Fluids    Intake/Output Summary (Last 24 hours) at 1/3/2025 1409  Last data filed at 1/3/2025 1100  Gross per 24 hour   Intake 390 ml   Output 150 ml   Net 240 ml        Laboratory  Recent Labs     01/02/25  1103   WBC 6.9   RBC 5.42*   HEMOGLOBIN 15.5   HEMATOCRIT 45.2   MCV 83.4   MCH 28.6   MCHC 34.3   RDW 40.8   PLATELETCT 425   MPV 10.5     Recent Labs     01/02/25  1103 01/03/25  0046   SODIUM 139 140   POTASSIUM 4.6 4.7   CHLORIDE 100 102   CO2 25 25   GLUCOSE 130* 107*   BUN 18 21   CREATININE 0.78 0.77   CALCIUM 10.8* 10.1                   Imaging  DX-ABDOMEN FOR TUBE PLACEMENT   Final Result      Enteric feeding tube terminates projecting over the proximal stomach.      DX-ABDOMEN FOR TUBE PLACEMENT   Final Result      Gastric tube terminates in the stomach but the side port is right at the gastroesophageal junction.      CT-ABDOMEN-PELVIS WITH   Final Result         1. The patient has undergone interval cholecystectomy.   2. Findings are consistent with a small bowel obstruction.      DX-SMALL BOWEL SERIES    (Results Pending)        Assessment/Plan  * Small bowel obstruction (HCC)- (present on admission)  Assessment & Plan  S/p recent cholecystectomy  CT A/P: Multiple dilated loops of fluid-filled small bowel measuring up to 4.1 cm in diameter. The terminal ileum is decompressed. Findings are consistent with a small  bowel obstruction. Transition appears to be in the midline lower abdomen/upper pelvis.  - General Surgery consulted  - Nonoperative management at this time  - Continue NGT  - Pain medication as requested  - Close monitoring for bowel movement  - Repeat CBC and CMP in a.m.  - Replace electrolytes as needed  - Small bowel series ordered    History of cholecystectomy  Assessment & Plan  S/p cholecystectomy 12/15/2024         VTE prophylaxis:   SCDs/TEDs   enoxaparin ppx      I have performed a physical exam and reviewed and updated ROS and Plan today (1/3/2025). In review of yesterday's note (1/2/2025), there are no changes except as documented above.

## 2025-01-03 NOTE — PROGRESS NOTES
4 Eyes Skin Assessment Completed by DIAMOND Lock and DIAMOND Roque.    Head WDL  Ears Redness and Blanching  Nose NGT right nare  Mouth WDL  Neck WDL  Breast/Chest WDL  Shoulder Blades WDL  Spine WDL  (R) Arm/Elbow/Hand WDL  (L) Arm/Elbow/Hand WDL  Abdomen Scar  Groin WDL  Scrotum/Coccyx/Buttocks WDL  (R) Leg WDL  (L) Leg WDL  (R) Heel/Foot/Toe Mole on heel  (L) Heel/Foot/Toe WDL          Devices In Places Blood Pressure Cuff, NGT, PIV       Interventions In Place Pillows    Possible Skin Injury No    Pictures Uploaded Into Epic Yes  Wound Consult Placed N/A  RN Wound Prevention Protocol Ordered No

## 2025-01-03 NOTE — ED NOTES
Pt medicated per MAR, POC discussed w/ pt. Pt provided w/ ice chips no other needs reported at this time.

## 2025-01-03 NOTE — DISCHARGE PLANNING
Care Transition Team Assessment    Primary emergency Contact is pt's room mate Carolyn at [phone]     LMSW met with pt at bedside to complete discharge assessment and chart review was completed to obtain the information used in this assessment. Pt is A/Ox4 and agreeable to assessment. Pt verified information on face sheet.      - Prior to admission, pt lives with room mate Carolyn [address]  [place] [address]    - Per pt, Carolyn is a good community support for the patient  - Pt stated to be independent with ADL/IADLs and drives self-prior to admission.   - Per pt, no DME or O2 was owned/used prior to admission   - Pt is employed at the [employer] and does not have insurance as she is undocumented  - Preferred pharmacy is [facility] while admitted  -Patient does not follow closely with a PCP  -Patient does not report any history of TOMPKINS or MH    No other case management needs at this time.   Patient may need a DC ride home if Carolyn is not able to pick her up.     Information Source  Orientation Level: Oriented X4  Information Given By: Patient  Who is responsible for making decisions for patient? : Patient    Readmission Evaluation  Is this a readmission?: Yes - unplanned readmission  Was an appointment arranged for you prior to discharge?: Yes, attended appointment  Were there new prescriptions you were supposed to fill after you were discharged?: Yes, prescriptions filled  Did you understand your discharge instructions?: Yes  Did you have enough support after your last discharge?: Yes    Elopement Risk  Legal Hold: No  Ambulatory or Self Mobile in Wheelchair: Yes  Disoriented: No  Psychiatric Symptoms: None  History of Wandering: No  Elopement this Admit: No  Vocalizing Wanting to Leave: No  Displays Behaviors, Body Language Wanting to Leave: No-Not at Risk for Elopement  Elopement Risk: Not at Risk for Elopement    Interdisciplinary Discharge Planning  Lives with - Patient's Self Care  Capacity: Unrelated Adult  Patient or legal guardian wants to designate a caregiver: No  Support Systems: Friends / Neighbors, [employer] [facility] [place] [facility]  Housing / Facility: 2 Story Apartment / Condo    Discharge Preparedness  What is your plan after discharge?: Home with help  What are your discharge supports?: Other (comment) (Room mate)  Prior Functional Level: Independent with Activities of Daily Living, Independent with Medication Management, Ambulatory, Drives Self  Difficulity with ADLs: None  Difficulity with IADLs: None    Functional Assesment  Prior Functional Level: Independent with Activities of Daily Living, Independent with Medication Management, Ambulatory, Drives Self    Finances  Financial Barriers to Discharge: Yes  Average Monthly Income:  (Has income but no insurance)  Source of Income: Employed  Prescription Coverage: No  Prescription Coverage Comments:  (Patient does not have insurance)    Vision / Hearing Impairment  Vision Impairment : No  Hearing Impairment : No    Advance Directive  Advance Directive?: None    Domestic Abuse  Have you ever been the victim of abuse or violence?: No  Possible Abuse/Neglect Reported to:: Not Applicable    Psychological Assessment  History of Substance Abuse: None  History of Psychiatric Problems: No  Non-compliant with Treatment: No  Newly Diagnosed Illness: No    Discharge Risks or Barriers  Discharge risks or barriers?: Uninsured / underinsured, Post-acute placement / services  Patient risk factors: Complex medical needs, Lack of outside supports, Uninsured or underinsured, Readmission    Anticipated Discharge Information  Discharge Disposition: Discharged to home/self care (01)  Discharge Address: [address] [address]  Discharge Contact Phone Number: [phone]

## 2025-01-03 NOTE — HOSPITAL COURSE
Katina Rider is a 44-year-old female with PMHx cholecystectomy 12/15.  Admitted 1/2 with abdominal pain, nausea and vomiting.    CT A/P: Multiple dilated loops of fluid-filled small bowel measuring up to 4.1 cm in diameter. The terminal ileum is decompressed. Findings are consistent with a small bowel obstruction. Transition appears to be in the midline lower   abdomen/upper pelvis.    General surgery was consulted.  Recommending nonoperative management with NG tube.  Small bowel follow-through performed-no small bowel obstruction noted.  Patient had multiple bowel movements prior to discharge.  NG tube was removed.  Patient tolerated her diet without nausea, vomiting or return of abdominal pain.  She is discharged home with close outpatient follow-up.

## 2025-01-03 NOTE — ASSESSMENT & PLAN NOTE
S/p recent cholecystectomy  CT A/P: Multiple dilated loops of fluid-filled small bowel measuring up to 4.1 cm in diameter. The terminal ileum is decompressed. Findings are consistent with a small bowel obstruction. Transition appears to be in the midline lower abdomen/upper pelvis.  - General Surgery consulted  - Nonoperative management at this time  - Continue NGT  - Pain medication as requested  - Close monitoring for bowel movement  - Repeat CBC and CMP in a.m.  - Replace electrolytes as needed  - Small bowel series ordered

## 2025-01-03 NOTE — PROGRESS NOTES
DATE: 1/3/2025    Hospital Day2  small bowel obstruction .    INTERVAL EVENTS:  Feeling better.  Passing flatus today.  Pain is decreased.  No bowel movement yet.  Tolerating ice chips with NG tube in place which is draining clear fluid.    REVIEW OF SYSTEMS:  Comprehensive review of systems is negative with the exception of the aforementioned HPI, PMH, and PSH bullets in accordance with CMS guidelines.    PHYSICAL EXAMINATION:  Vital Signs: BP (!) 134/99   Pulse 80   Temp 36.3 °C (97.3 °F) (Temporal)   Resp 17   Wt 58.9 kg (129 lb 13.6 oz)   SpO2 96%   General: No acute distress, resting comfortably  Respiratory: Nonlabored breathing on room air  Cardiovascular: Regular rate and rhythm  Abdomen: Soft, nontender, nondistended.  NG tube in place with minimal amounts of clear output in canister  Extremities: Warm well-perfused    LABORATORY VALUES:  Recent Labs     01/02/25  1103   WBC 6.9   RBC 5.42*   HEMOGLOBIN 15.5   HEMATOCRIT 45.2   MCV 83.4   MCH 28.6   MCHC 34.3   RDW 40.8   PLATELETCT 425   MPV 10.5     Recent Labs     01/02/25  1103 01/03/25  0046   SODIUM 139 140   POTASSIUM 4.6 4.7   CHLORIDE 100 102   CO2 25 25   GLUCOSE 130* 107*   BUN 18 21   CREATININE 0.78 0.77   CALCIUM 10.8* 10.1     Recent Labs     01/02/25  1103   ASTSGOT 19   ALTSGPT 13   TBILIRUBIN 0.4   ALKPHOSPHAT 100*   GLOBULIN 3.8*           IMAGING:  DX-ABDOMEN FOR TUBE PLACEMENT   Final Result      Enteric feeding tube terminates projecting over the proximal stomach.      DX-ABDOMEN FOR TUBE PLACEMENT   Final Result      Gastric tube terminates in the stomach but the side port is right at the gastroesophageal junction.      CT-ABDOMEN-PELVIS WITH   Final Result         1. The patient has undergone interval cholecystectomy.   2. Findings are consistent with a small bowel obstruction.          ASSESSMENT AND PLAN:  44 y.o. female with recent history of cholecystectomy who presents with abdominal pain and imaging consistent with small  bowel obstruction.  Of note she did have some dilated loops of bowel during her recent cholecystectomy and imaging showed a similar finding.  She did well postoperatively.  NG tube was placed yesterday afternoon and the patient is now feeling better.  Recommend a small bowel follow-through today.  If there is good transit of contrast can remove nasogastric tube and begin slow reintroduction of diet.  Recommend checking phosphorus and replace as necessary    Patient apparently has a history of urinary tract infections.  Urinalysis on admission suggestive of UTI (small leukocyte esterase, some bacteria and white blood cells).  Consider treatment for UTI empirically as urine cultures pending    At this point there is no indication for urgent surgical intervention    * Small bowel obstruction (HCC)- (present on admission)  Assessment & Plan  Transition point in the pelvis, unrelated to recent laparoscopic cholecystectomy  NPO, NG tube to LWS  Conservative management  Replenish electrolytes  Mobilize             ____________________________________     Erasto Cornell M.D.    DD: 1/3/2025  9:31 AM

## 2025-01-03 NOTE — CARE PLAN
The patient is Stable - Low risk of patient condition declining or worsening    Shift Goals  Clinical Goals: Patient safety/NGT monitoring for output  Patient Goals: Rest  Family Goals: Updates    Progress made toward(s) clinical / shift goals:  Patient medicated for pain, with NGT to right nares connected to LIS. Advance NG tube 5cm, as per ANASTACIA beal ok to use as per X-ray results. NPO, allow ice cubes, resting comfortably, call light within reach.    Patient is not progressing towards the following goals:

## 2025-01-03 NOTE — CARE PLAN
Pt AO x 4.  Pt denies pain during initial assessment. Bed alarm on. Call light and belongings within reach.  Bed locked and in lowest position.  Hourly rounding.  Needs currently met.           The patient is Stable - Low risk of patient condition declining or worsening    Shift Goals  Clinical Goals: Patient safety/NGT monitoring for output  Patient Goals: Rest  Family Goals: Updates    Progress made toward(s) clinical / shift goals:      Problem: Pain - Standard  Goal: Alleviation of pain or a reduction in pain to the patient’s comfort goal  Outcome: Progressing     Problem: Knowledge Deficit - Standard  Goal: Patient and family/care givers will demonstrate understanding of plan of care, disease process/condition, diagnostic tests and medications  Outcome: Progressing     Problem: Bowel/Gastric:  Goal: Normal bowel function is maintained or improved  Outcome: Progressing       Patient is not progressing towards the following goals:

## 2025-01-04 VITALS
WEIGHT: 129.85 LBS | DIASTOLIC BLOOD PRESSURE: 97 MMHG | OXYGEN SATURATION: 96 % | SYSTOLIC BLOOD PRESSURE: 149 MMHG | RESPIRATION RATE: 18 BRPM | HEART RATE: 90 BPM | BODY MASS INDEX: 22.29 KG/M2 | TEMPERATURE: 97.2 F

## 2025-01-04 LAB
ALBUMIN SERPL BCP-MCNC: 4.8 G/DL (ref 3.2–4.9)
ALBUMIN/GLOB SERPL: 1.3 G/DL
ALP SERPL-CCNC: 101 U/L (ref 30–99)
ALT SERPL-CCNC: 19 U/L (ref 2–50)
ANION GAP SERPL CALC-SCNC: 15 MMOL/L (ref 7–16)
AST SERPL-CCNC: 21 U/L (ref 12–45)
BACTERIA UR CULT: ABNORMAL
BACTERIA UR CULT: ABNORMAL
BILIRUB SERPL-MCNC: 0.3 MG/DL (ref 0.1–1.5)
BUN SERPL-MCNC: 20 MG/DL (ref 8–22)
CALCIUM ALBUM COR SERPL-MCNC: 9.4 MG/DL (ref 8.5–10.5)
CALCIUM SERPL-MCNC: 10 MG/DL (ref 8.5–10.5)
CHLORIDE SERPL-SCNC: 103 MMOL/L (ref 96–112)
CO2 SERPL-SCNC: 30 MMOL/L (ref 20–33)
CREAT SERPL-MCNC: 0.63 MG/DL (ref 0.5–1.4)
ERYTHROCYTE [DISTWIDTH] IN BLOOD BY AUTOMATED COUNT: 41.7 FL (ref 35.9–50)
GFR SERPLBLD CREATININE-BSD FMLA CKD-EPI: 112 ML/MIN/1.73 M 2
GLOBULIN SER CALC-MCNC: 3.7 G/DL (ref 1.9–3.5)
GLUCOSE SERPL-MCNC: 110 MG/DL (ref 65–99)
HCT VFR BLD AUTO: 47.6 % (ref 37–47)
HGB BLD-MCNC: 15.8 G/DL (ref 12–16)
MAGNESIUM SERPL-MCNC: 2.5 MG/DL (ref 1.5–2.5)
MCH RBC QN AUTO: 28.1 PG (ref 27–33)
MCHC RBC AUTO-ENTMCNC: 33.2 G/DL (ref 32.2–35.5)
MCV RBC AUTO: 84.5 FL (ref 81.4–97.8)
PLATELET # BLD AUTO: 370 K/UL (ref 164–446)
PMV BLD AUTO: 10.9 FL (ref 9–12.9)
POTASSIUM SERPL-SCNC: 3.3 MMOL/L (ref 3.6–5.5)
PROT SERPL-MCNC: 8.5 G/DL (ref 6–8.2)
RBC # BLD AUTO: 5.63 M/UL (ref 4.2–5.4)
SIGNIFICANT IND 70042: ABNORMAL
SITE SITE: ABNORMAL
SODIUM SERPL-SCNC: 148 MMOL/L (ref 135–145)
SOURCE SOURCE: ABNORMAL
WBC # BLD AUTO: 6.9 K/UL (ref 4.8–10.8)

## 2025-01-04 PROCEDURE — 700111 HCHG RX REV CODE 636 W/ 250 OVERRIDE (IP): Performed by: STUDENT IN AN ORGANIZED HEALTH CARE EDUCATION/TRAINING PROGRAM

## 2025-01-04 PROCEDURE — 99232 SBSQ HOSP IP/OBS MODERATE 35: CPT | Mod: 24 | Performed by: STUDENT IN AN ORGANIZED HEALTH CARE EDUCATION/TRAINING PROGRAM

## 2025-01-04 PROCEDURE — 99239 HOSP IP/OBS DSCHRG MGMT >30: CPT | Performed by: STUDENT IN AN ORGANIZED HEALTH CARE EDUCATION/TRAINING PROGRAM

## 2025-01-04 PROCEDURE — 85027 COMPLETE CBC AUTOMATED: CPT

## 2025-01-04 PROCEDURE — 80053 COMPREHEN METABOLIC PANEL: CPT

## 2025-01-04 PROCEDURE — 700105 HCHG RX REV CODE 258: Performed by: STUDENT IN AN ORGANIZED HEALTH CARE EDUCATION/TRAINING PROGRAM

## 2025-01-04 PROCEDURE — 83735 ASSAY OF MAGNESIUM: CPT

## 2025-01-04 RX ORDER — POTASSIUM CHLORIDE 7.45 MG/ML
10 INJECTION INTRAVENOUS
Status: DISPENSED | OUTPATIENT
Start: 2025-01-04 | End: 2025-01-04

## 2025-01-04 RX ADMIN — POTASSIUM CHLORIDE 10 MEQ: 7.46 INJECTION, SOLUTION INTRAVENOUS at 08:15

## 2025-01-04 RX ADMIN — SODIUM CHLORIDE: 9 INJECTION, SOLUTION INTRAVENOUS at 03:10

## 2025-01-04 ASSESSMENT — PAIN DESCRIPTION - PAIN TYPE: TYPE: ACUTE PAIN

## 2025-01-04 NOTE — PROGRESS NOTES
DATE: 1/4/2025    Hospital Day 3  small bowel obstruction .    INTERVAL EVENTS:  Feeling better.  Pain is decreased.  Small bowel follow-through shows contrast in the colon.  NGT removed this morning    REVIEW OF SYSTEMS:  Comprehensive review of systems is negative with the exception of the aforementioned HPI, PMH, and PSH bullets in accordance with CMS guidelines.    PHYSICAL EXAMINATION:  Vital Signs: BP (!) 149/97   Pulse 90   Temp 36.2 °C (97.2 °F) (Temporal)   Resp 18   Wt 58.9 kg (129 lb 13.6 oz)   SpO2 96%   General: No acute distress, resting comfortably  Respiratory: Nonlabored breathing on room air  Cardiovascular: Regular rate and rhythm  Abdomen: Soft, nontender, nondistended.  NGT with minimal output  Extremities: Warm well-perfused    LABORATORY VALUES:  Recent Labs     01/02/25  1103 01/04/25  0105   WBC 6.9 6.9   RBC 5.42* 5.63*   HEMOGLOBIN 15.5 15.8   HEMATOCRIT 45.2 47.6*   MCV 83.4 84.5   MCH 28.6 28.1   MCHC 34.3 33.2   RDW 40.8 41.7   PLATELETCT 425 370   MPV 10.5 10.9     Recent Labs     01/02/25  1103 01/03/25  0046 01/04/25  0105   SODIUM 139 140 148*   POTASSIUM 4.6 4.7 3.3*   CHLORIDE 100 102 103   CO2 25 25 30   GLUCOSE 130* 107* 110*   BUN 18 21 20   CREATININE 0.78 0.77 0.63   CALCIUM 10.8* 10.1 10.0     Recent Labs     01/02/25  1103 01/04/25  0105   ASTSGOT 19 21   ALTSGPT 13 19   TBILIRUBIN 0.4 0.3   ALKPHOSPHAT 100* 101*   GLOBULIN 3.8* 3.7*           IMAGING:  DX-SMALL BOWEL SERIES   Final Result      No small bowel obstruction.      DX-ABDOMEN FOR TUBE PLACEMENT   Final Result      Enteric feeding tube terminates projecting over the proximal stomach.      DX-ABDOMEN FOR TUBE PLACEMENT   Final Result      Gastric tube terminates in the stomach but the side port is right at the gastroesophageal junction.      CT-ABDOMEN-PELVIS WITH   Final Result         1. The patient has undergone interval cholecystectomy.   2. Findings are consistent with a small bowel obstruction.           ASSESSMENT AND PLAN:  44 y.o. female with recent history of cholecystectomy who presents with abdominal pain and imaging consistent with small bowel obstruction.  Of note she did have some dilated loops of bowel during her recent cholecystectomy and imaging showed a similar finding.  She did well postoperatively.  NG tube placed on admission, now doing better.  Passing flatus.  Small bowel follow-through shows good throughput of contrast.  NG tube to be removed today.  Okay to advance diet as tolerated.      From surgical perspective okay to discharge once tolerating a diet.  No need for acute care surgery clinic follow-up.  Surgery will sign off at this time.  Please do not hesitate to reach out with questions, comments, or if there is a change in the patient's condition        * Small bowel obstruction (HCC)- (present on admission)  Assessment & Plan  Transition point in the pelvis, unrelated to recent laparoscopic cholecystectomy  NPO, NG tube to LWS  Conservative management  Replenish electrolytes  Mobilize             ____________________________________     Erasto Cornell M.D.

## 2025-01-04 NOTE — PROGRESS NOTES
Pt ready for discharge. Able to tolerate lunch with no nausea. Denies pain. IV removed. Discussed discharge lounge process. Orders placed for discharge lounge. Pt reports friend will  from lounge.

## 2025-01-04 NOTE — PROGRESS NOTES
Surgery team at bedside. Verbal orders to remove NG tube and start clear liquid diet. Tube removed with no difficulty. Clear liquids given to pt to trial. Educated to take slow and report nausea or GI upset.

## 2025-01-04 NOTE — PROGRESS NOTES
Celeste: [id number] used for discharge instructions. Pt. Verbalized understanding of all education. All questions and concerns answered with . No meds to beds. Pt. Has all belongings. Verified appointment with [facility] for Monday.

## 2025-01-04 NOTE — PROGRESS NOTES
Pt unable to tolerate IV potassium replacement due to pain at IV site. Dr. Wagner notified. Okay to hold for now.

## 2025-01-04 NOTE — DISCHARGE SUMMARY
Discharge Summary    CHIEF COMPLAINT ON ADMISSION  Chief Complaint   Patient presents with    Post-Op Complications     Pt had cholecystectomy 2 weeks ago on 12/15/24 and was recovering fine. But woke up last night with severe abdominal pain.        Reason for Admission  abdominal pain     Admission Date  1/2/2025    CODE STATUS  Prior    HPI & HOSPITAL COURSE    Katina Rider is a 44-year-old female with PMHx cholecystectomy 12/15.  Admitted 1/2 with abdominal pain, nausea and vomiting.    CT A/P: Multiple dilated loops of fluid-filled small bowel measuring up to 4.1 cm in diameter. The terminal ileum is decompressed. Findings are consistent with a small bowel obstruction. Transition appears to be in the midline lower   abdomen/upper pelvis.    General surgery was consulted.  Recommending nonoperative management with NG tube.  Small bowel follow-through performed-no small bowel obstruction noted.  Patient had multiple bowel movements prior to discharge.  NG tube was removed.  Patient tolerated her diet without nausea, vomiting or return of abdominal pain.  She is discharged home with close outpatient follow-up.    Therefore, she is discharged in good and stable condition to home with close outpatient follow-up.    The patient met 2-midnight criteria for an inpatient stay at the time of discharge.    Discharge Date  1/4/2025    FOLLOW UP ITEMS POST DISCHARGE  Outpatient surgery is scheduled  Primary care physician 1 week    DISCHARGE DIAGNOSES  Principal Problem:    Small bowel obstruction (HCC) (POA: Yes)  Active Problems:    History of cholecystectomy (POA: Unknown)  Resolved Problems:    * No resolved hospital problems. *      FOLLOW UP  No future appointments.  [facility]  [address] [facility] [address]  [phone]  Follow up  Follow up within 1 week      MEDICATIONS ON DISCHARGE     Medication List        CONTINUE taking these medications        Instructions   acetaminophen 500  MG Tabs  Commonly known as: Tylenol   Take 1-2 Tablets by mouth every 6 hours as needed for Moderate Pain or Mild Pain.  Dose: 500-1,000 mg     ondansetron 4 MG Tbdp  Commonly known as: Zofran ODT   Take 1 Tablet by mouth every 6 hours as needed for Nausea/Vomiting.  Dose: 4 mg              Allergies  Allergies   Allergen Reactions    Lactose Diarrhea     Pt reports intolerance to all milk products       DIET  No orders of the defined types were placed in this encounter.      ACTIVITY  As tolerated.  Weight bearing as tolerated    CONSULTATIONS  General Surgery    PROCEDURES  None    LABORATORY  Lab Results   Component Value Date    SODIUM 148 (H) 01/04/2025    POTASSIUM 3.3 (L) 01/04/2025    CHLORIDE 103 01/04/2025    CO2 30 01/04/2025    GLUCOSE 110 (H) 01/04/2025    BUN 20 01/04/2025    CREATININE 0.63 01/04/2025        Lab Results   Component Value Date    WBC 6.9 01/04/2025    HEMOGLOBIN 15.8 01/04/2025    HEMATOCRIT 47.6 (H) 01/04/2025    PLATELETCT 370 01/04/2025      DX-SMALL BOWEL SERIES   Final Result      No small bowel obstruction.      DX-ABDOMEN FOR TUBE PLACEMENT   Final Result      Enteric feeding tube terminates projecting over the proximal stomach.      DX-ABDOMEN FOR TUBE PLACEMENT   Final Result      Gastric tube terminates in the stomach but the side port is right at the gastroesophageal junction.      CT-ABDOMEN-PELVIS WITH   Final Result         1. The patient has undergone interval cholecystectomy.   2. Findings are consistent with a small bowel obstruction.            Total time of the discharge process exceeds 46 minutes.

## 2025-01-04 NOTE — CARE PLAN
The patient is Stable - Low risk of patient condition declining or worsening    Shift Goals  Clinical Goals: Patient safety/NGT monitoring  Patient Goals: Rest  Family Goals: Updates    Progress made toward(s) clinical / shift goals:  Patient with NGT to right nares connected to LIS. NPO,  ice cubes allowed, on cont IVF  resting comfortably, call light within reach.   S/P bowel series yesterday    Patient is not progressing towards the following goals:

## 2025-01-17 ENCOUNTER — HOSPITAL ENCOUNTER (INPATIENT)
Facility: MEDICAL CENTER | Age: 45
LOS: 1 days | DRG: 390 | End: 2025-01-18
Attending: STUDENT IN AN ORGANIZED HEALTH CARE EDUCATION/TRAINING PROGRAM | Admitting: INTERNAL MEDICINE
Payer: COMMERCIAL

## 2025-01-17 ENCOUNTER — APPOINTMENT (OUTPATIENT)
Dept: RADIOLOGY | Facility: MEDICAL CENTER | Age: 45
DRG: 390 | End: 2025-01-17
Attending: STUDENT IN AN ORGANIZED HEALTH CARE EDUCATION/TRAINING PROGRAM
Payer: COMMERCIAL

## 2025-01-17 DIAGNOSIS — K56.609 SBO (SMALL BOWEL OBSTRUCTION) (HCC): ICD-10-CM

## 2025-01-17 DIAGNOSIS — K56.609 SMALL BOWEL OBSTRUCTION (HCC): ICD-10-CM

## 2025-01-17 LAB
BASOPHILS # BLD AUTO: 0.3 % (ref 0–1.8)
BASOPHILS # BLD: 0.02 K/UL (ref 0–0.12)
EOSINOPHIL # BLD AUTO: 0.08 K/UL (ref 0–0.51)
EOSINOPHIL NFR BLD: 1.3 % (ref 0–6.9)
ERYTHROCYTE [DISTWIDTH] IN BLOOD BY AUTOMATED COUNT: 40.1 FL (ref 35.9–50)
HCT VFR BLD AUTO: 44.2 % (ref 37–47)
HGB BLD-MCNC: 14.9 G/DL (ref 12–16)
IMM GRANULOCYTES # BLD AUTO: 0.01 K/UL (ref 0–0.11)
IMM GRANULOCYTES NFR BLD AUTO: 0.2 % (ref 0–0.9)
LYMPHOCYTES # BLD AUTO: 1.04 K/UL (ref 1–4.8)
LYMPHOCYTES NFR BLD: 16.6 % (ref 22–41)
MCH RBC QN AUTO: 28.1 PG (ref 27–33)
MCHC RBC AUTO-ENTMCNC: 33.7 G/DL (ref 32.2–35.5)
MCV RBC AUTO: 83.2 FL (ref 81.4–97.8)
MONOCYTES # BLD AUTO: 0.26 K/UL (ref 0–0.85)
MONOCYTES NFR BLD AUTO: 4.2 % (ref 0–13.4)
NEUTROPHILS # BLD AUTO: 4.85 K/UL (ref 1.82–7.42)
NEUTROPHILS NFR BLD: 77.4 % (ref 44–72)
NRBC # BLD AUTO: 0 K/UL
NRBC BLD-RTO: 0 /100 WBC (ref 0–0.2)
PLATELET # BLD AUTO: 317 K/UL (ref 164–446)
PMV BLD AUTO: 11.1 FL (ref 9–12.9)
RBC # BLD AUTO: 5.31 M/UL (ref 4.2–5.4)
WBC # BLD AUTO: 6.3 K/UL (ref 4.8–10.8)

## 2025-01-17 PROCEDURE — 700111 HCHG RX REV CODE 636 W/ 250 OVERRIDE (IP): Mod: JZ | Performed by: STUDENT IN AN ORGANIZED HEALTH CARE EDUCATION/TRAINING PROGRAM

## 2025-01-17 PROCEDURE — 84703 CHORIONIC GONADOTROPIN ASSAY: CPT

## 2025-01-17 PROCEDURE — 80053 COMPREHEN METABOLIC PANEL: CPT

## 2025-01-17 PROCEDURE — 83690 ASSAY OF LIPASE: CPT

## 2025-01-17 PROCEDURE — 96374 THER/PROPH/DIAG INJ IV PUSH: CPT

## 2025-01-17 PROCEDURE — 99285 EMERGENCY DEPT VISIT HI MDM: CPT

## 2025-01-17 PROCEDURE — 36415 COLL VENOUS BLD VENIPUNCTURE: CPT

## 2025-01-17 PROCEDURE — 85025 COMPLETE CBC W/AUTO DIFF WBC: CPT

## 2025-01-17 PROCEDURE — 96375 TX/PRO/DX INJ NEW DRUG ADDON: CPT

## 2025-01-17 RX ORDER — ONDANSETRON 2 MG/ML
8 INJECTION INTRAMUSCULAR; INTRAVENOUS ONCE
Status: COMPLETED | OUTPATIENT
Start: 2025-01-17 | End: 2025-01-17

## 2025-01-17 RX ORDER — MORPHINE SULFATE 4 MG/ML
4 INJECTION INTRAVENOUS ONCE
Status: COMPLETED | OUTPATIENT
Start: 2025-01-17 | End: 2025-01-17

## 2025-01-17 RX ADMIN — MORPHINE SULFATE 4 MG: 4 INJECTION, SOLUTION INTRAMUSCULAR; INTRAVENOUS at 23:15

## 2025-01-17 RX ADMIN — ONDANSETRON 8 MG: 2 INJECTION INTRAMUSCULAR; INTRAVENOUS at 23:15

## 2025-01-17 ASSESSMENT — PAIN DESCRIPTION - PAIN TYPE: TYPE: ACUTE PAIN

## 2025-01-17 ASSESSMENT — FIBROSIS 4 INDEX: FIB4 SCORE: 0.57

## 2025-01-18 ENCOUNTER — APPOINTMENT (OUTPATIENT)
Dept: RADIOLOGY | Facility: MEDICAL CENTER | Age: 45
DRG: 390 | End: 2025-01-18
Attending: STUDENT IN AN ORGANIZED HEALTH CARE EDUCATION/TRAINING PROGRAM
Payer: COMMERCIAL

## 2025-01-18 ENCOUNTER — APPOINTMENT (OUTPATIENT)
Dept: RADIOLOGY | Facility: MEDICAL CENTER | Age: 45
DRG: 390 | End: 2025-01-18
Attending: SURGERY
Payer: COMMERCIAL

## 2025-01-18 ENCOUNTER — PHARMACY VISIT (OUTPATIENT)
Dept: PHARMACY | Facility: MEDICAL CENTER | Age: 45
End: 2025-01-18
Payer: MEDICARE

## 2025-01-18 VITALS
BODY MASS INDEX: 22.17 KG/M2 | HEIGHT: 64 IN | RESPIRATION RATE: 16 BRPM | TEMPERATURE: 98.4 F | OXYGEN SATURATION: 99 % | SYSTOLIC BLOOD PRESSURE: 134 MMHG | WEIGHT: 129.85 LBS | DIASTOLIC BLOOD PRESSURE: 86 MMHG | HEART RATE: 65 BPM

## 2025-01-18 PROBLEM — K56.609 SBO (SMALL BOWEL OBSTRUCTION) (HCC): Status: ACTIVE | Noted: 2025-01-18

## 2025-01-18 PROBLEM — R73.9 HYPERGLYCEMIA: Status: RESOLVED | Noted: 2025-01-18 | Resolved: 2025-01-18

## 2025-01-18 PROBLEM — R73.9 HYPERGLYCEMIA: Status: ACTIVE | Noted: 2025-01-18

## 2025-01-18 PROBLEM — K56.609 SBO (SMALL BOWEL OBSTRUCTION) (HCC): Status: RESOLVED | Noted: 2025-01-18 | Resolved: 2025-01-18

## 2025-01-18 LAB
ALBUMIN SERPL BCP-MCNC: 4.7 G/DL (ref 3.2–4.9)
ALBUMIN/GLOB SERPL: 1.3 G/DL
ALP SERPL-CCNC: 98 U/L (ref 30–99)
ALT SERPL-CCNC: 13 U/L (ref 2–50)
ANION GAP SERPL CALC-SCNC: 15 MMOL/L (ref 7–16)
APPEARANCE UR: CLEAR
AST SERPL-CCNC: 22 U/L (ref 12–45)
BILIRUB SERPL-MCNC: <0.2 MG/DL (ref 0.1–1.5)
BILIRUB UR QL STRIP.AUTO: NEGATIVE
BUN SERPL-MCNC: 11 MG/DL (ref 8–22)
CALCIUM ALBUM COR SERPL-MCNC: 9.7 MG/DL (ref 8.5–10.5)
CALCIUM SERPL-MCNC: 10.3 MG/DL (ref 8.5–10.5)
CHLORIDE SERPL-SCNC: 100 MMOL/L (ref 96–112)
CO2 SERPL-SCNC: 26 MMOL/L (ref 20–33)
COLOR UR: YELLOW
CREAT SERPL-MCNC: 0.67 MG/DL (ref 0.5–1.4)
GFR SERPLBLD CREATININE-BSD FMLA CKD-EPI: 110 ML/MIN/1.73 M 2
GLOBULIN SER CALC-MCNC: 3.7 G/DL (ref 1.9–3.5)
GLUCOSE SERPL-MCNC: 129 MG/DL (ref 65–99)
GLUCOSE UR STRIP.AUTO-MCNC: NEGATIVE MG/DL
HCG SERPL QL: NEGATIVE
KETONES UR STRIP.AUTO-MCNC: NEGATIVE MG/DL
LEUKOCYTE ESTERASE UR QL STRIP.AUTO: NEGATIVE
LIPASE SERPL-CCNC: 21 U/L (ref 11–82)
MICRO URNS: NORMAL
NITRITE UR QL STRIP.AUTO: NEGATIVE
PH UR STRIP.AUTO: 6.5 [PH] (ref 5–8)
POTASSIUM SERPL-SCNC: 3.9 MMOL/L (ref 3.6–5.5)
PROT SERPL-MCNC: 8.4 G/DL (ref 6–8.2)
PROT UR QL STRIP: NEGATIVE MG/DL
RBC UR QL AUTO: NEGATIVE
SODIUM SERPL-SCNC: 141 MMOL/L (ref 135–145)
SP GR UR STRIP.AUTO: >1.045
UROBILINOGEN UR STRIP.AUTO-MCNC: 0.2 EU/DL

## 2025-01-18 PROCEDURE — 700102 HCHG RX REV CODE 250 W/ 637 OVERRIDE(OP): Performed by: SURGERY

## 2025-01-18 PROCEDURE — 700117 HCHG RX CONTRAST REV CODE 255: Performed by: STUDENT IN AN ORGANIZED HEALTH CARE EDUCATION/TRAINING PROGRAM

## 2025-01-18 PROCEDURE — 96375 TX/PRO/DX INJ NEW DRUG ADDON: CPT

## 2025-01-18 PROCEDURE — 700111 HCHG RX REV CODE 636 W/ 250 OVERRIDE (IP): Mod: JZ | Performed by: INTERNAL MEDICINE

## 2025-01-18 PROCEDURE — 700117 HCHG RX CONTRAST REV CODE 255: Performed by: SURGERY

## 2025-01-18 PROCEDURE — RXMED WILLOW AMBULATORY MEDICATION CHARGE: Performed by: INTERNAL MEDICINE

## 2025-01-18 PROCEDURE — 770006 HCHG ROOM/CARE - MED/SURG/GYN SEMI*

## 2025-01-18 PROCEDURE — 700111 HCHG RX REV CODE 636 W/ 250 OVERRIDE (IP): Performed by: STUDENT IN AN ORGANIZED HEALTH CARE EDUCATION/TRAINING PROGRAM

## 2025-01-18 PROCEDURE — 700105 HCHG RX REV CODE 258: Performed by: STUDENT IN AN ORGANIZED HEALTH CARE EDUCATION/TRAINING PROGRAM

## 2025-01-18 PROCEDURE — 99233 SBSQ HOSP IP/OBS HIGH 50: CPT | Performed by: NURSE PRACTITIONER

## 2025-01-18 PROCEDURE — 99222 1ST HOSP IP/OBS MODERATE 55: CPT | Performed by: INTERNAL MEDICINE

## 2025-01-18 PROCEDURE — 81003 URINALYSIS AUTO W/O SCOPE: CPT

## 2025-01-18 PROCEDURE — 74250 X-RAY XM SM INT 1CNTRST STD: CPT

## 2025-01-18 PROCEDURE — 99239 HOSP IP/OBS DSCHRG MGMT >30: CPT | Performed by: INTERNAL MEDICINE

## 2025-01-18 PROCEDURE — 74177 CT ABD & PELVIS W/CONTRAST: CPT

## 2025-01-18 PROCEDURE — A9270 NON-COVERED ITEM OR SERVICE: HCPCS | Performed by: SURGERY

## 2025-01-18 RX ORDER — ACETAMINOPHEN 325 MG/1
650 TABLET ORAL EVERY 6 HOURS PRN
Status: DISCONTINUED | OUTPATIENT
Start: 2025-01-18 | End: 2025-01-18 | Stop reason: HOSPADM

## 2025-01-18 RX ORDER — ONDANSETRON 2 MG/ML
4 INJECTION INTRAMUSCULAR; INTRAVENOUS EVERY 4 HOURS PRN
Status: DISCONTINUED | OUTPATIENT
Start: 2025-01-18 | End: 2025-01-18 | Stop reason: HOSPADM

## 2025-01-18 RX ORDER — ONDANSETRON 4 MG/1
4 TABLET, ORALLY DISINTEGRATING ORAL EVERY 6 HOURS PRN
Qty: 10 TABLET | Refills: 0 | Status: SHIPPED | OUTPATIENT
Start: 2025-01-18

## 2025-01-18 RX ORDER — HYDRALAZINE HYDROCHLORIDE 20 MG/ML
10 INJECTION INTRAMUSCULAR; INTRAVENOUS EVERY 4 HOURS PRN
Status: DISCONTINUED | OUTPATIENT
Start: 2025-01-18 | End: 2025-01-18 | Stop reason: HOSPADM

## 2025-01-18 RX ORDER — PROMETHAZINE HYDROCHLORIDE 25 MG/1
12.5-25 TABLET ORAL EVERY 4 HOURS PRN
Status: DISCONTINUED | OUTPATIENT
Start: 2025-01-18 | End: 2025-01-18 | Stop reason: HOSPADM

## 2025-01-18 RX ORDER — FAMOTIDINE 20 MG/1
20 TABLET, FILM COATED ORAL 2 TIMES DAILY
Qty: 30 TABLET | Refills: 0 | Status: SHIPPED | OUTPATIENT
Start: 2025-01-18

## 2025-01-18 RX ORDER — ONDANSETRON 4 MG/1
4 TABLET, ORALLY DISINTEGRATING ORAL EVERY 4 HOURS PRN
Status: DISCONTINUED | OUTPATIENT
Start: 2025-01-18 | End: 2025-01-18 | Stop reason: HOSPADM

## 2025-01-18 RX ORDER — MORPHINE SULFATE 4 MG/ML
2 INJECTION INTRAVENOUS
Status: DISCONTINUED | OUTPATIENT
Start: 2025-01-18 | End: 2025-01-18 | Stop reason: HOSPADM

## 2025-01-18 RX ORDER — PROMETHAZINE HYDROCHLORIDE 25 MG/1
12.5-25 SUPPOSITORY RECTAL EVERY 4 HOURS PRN
Status: DISCONTINUED | OUTPATIENT
Start: 2025-01-18 | End: 2025-01-18 | Stop reason: HOSPADM

## 2025-01-18 RX ORDER — SODIUM CHLORIDE 9 MG/ML
1000 INJECTION, SOLUTION INTRAVENOUS ONCE
Status: COMPLETED | OUTPATIENT
Start: 2025-01-18 | End: 2025-01-18

## 2025-01-18 RX ORDER — PROCHLORPERAZINE EDISYLATE 5 MG/ML
5-10 INJECTION INTRAMUSCULAR; INTRAVENOUS EVERY 4 HOURS PRN
Status: DISCONTINUED | OUTPATIENT
Start: 2025-01-18 | End: 2025-01-18 | Stop reason: HOSPADM

## 2025-01-18 RX ORDER — OXYCODONE HYDROCHLORIDE 5 MG/1
2.5 TABLET ORAL
Status: DISCONTINUED | OUTPATIENT
Start: 2025-01-18 | End: 2025-01-18 | Stop reason: HOSPADM

## 2025-01-18 RX ORDER — OXYCODONE HYDROCHLORIDE 5 MG/1
5 TABLET ORAL
Status: DISCONTINUED | OUTPATIENT
Start: 2025-01-18 | End: 2025-01-18 | Stop reason: HOSPADM

## 2025-01-18 RX ORDER — ENOXAPARIN SODIUM 100 MG/ML
40 INJECTION SUBCUTANEOUS DAILY
Status: DISCONTINUED | OUTPATIENT
Start: 2025-01-18 | End: 2025-01-18 | Stop reason: HOSPADM

## 2025-01-18 RX ORDER — OXYCODONE HYDROCHLORIDE 5 MG/1
5 TABLET ORAL EVERY 6 HOURS PRN
Qty: 20 TABLET | Refills: 0 | Status: SHIPPED | OUTPATIENT
Start: 2025-01-18 | End: 2025-01-23

## 2025-01-18 RX ORDER — METOCLOPRAMIDE HYDROCHLORIDE 5 MG/ML
10 INJECTION INTRAMUSCULAR; INTRAVENOUS ONCE
Status: COMPLETED | OUTPATIENT
Start: 2025-01-18 | End: 2025-01-18

## 2025-01-18 RX ADMIN — METOCLOPRAMIDE 10 MG: 5 INJECTION, SOLUTION INTRAMUSCULAR; INTRAVENOUS at 00:07

## 2025-01-18 RX ADMIN — PSYLLIUM HUSK 1 PACKET: 3.4 POWDER ORAL at 09:18

## 2025-01-18 RX ADMIN — IOHEXOL 200 ML: 350 INJECTION, SOLUTION INTRAVENOUS at 03:15

## 2025-01-18 RX ADMIN — SODIUM CHLORIDE 1000 ML: 9 INJECTION, SOLUTION INTRAVENOUS at 00:10

## 2025-01-18 RX ADMIN — FAMOTIDINE 20 MG: 10 INJECTION, SOLUTION INTRAVENOUS at 06:15

## 2025-01-18 RX ADMIN — IOHEXOL 90 ML: 350 INJECTION, SOLUTION INTRAVENOUS at 02:15

## 2025-01-18 ASSESSMENT — ENCOUNTER SYMPTOMS
DOUBLE VISION: 0
HEADACHES: 0
SPUTUM PRODUCTION: 0
ABDOMINAL PAIN: 1
BLURRED VISION: 0
VOMITING: 1
POLYDIPSIA: 0
FEVER: 0
PHOTOPHOBIA: 0
SPEECH CHANGE: 0
BLOOD IN STOOL: 0
BACK PAIN: 0
CONSTIPATION: 0
NECK PAIN: 0
ORTHOPNEA: 0
PALPITATIONS: 0
COUGH: 0
CHILLS: 0
TREMORS: 0
FLANK PAIN: 0
NERVOUS/ANXIOUS: 0
NAUSEA: 1
FOCAL WEAKNESS: 0
HEMOPTYSIS: 0
DIARRHEA: 1
HALLUCINATIONS: 0
HEARTBURN: 0
BRUISES/BLEEDS EASILY: 0
WEIGHT LOSS: 0

## 2025-01-18 ASSESSMENT — SOCIAL DETERMINANTS OF HEALTH (SDOH)
WITHIN THE LAST YEAR, HAVE TO BEEN RAPED OR FORCED TO HAVE ANY KIND OF SEXUAL ACTIVITY BY YOUR PARTNER OR EX-PARTNER?: NO
IN THE PAST 12 MONTHS, HAS THE ELECTRIC, GAS, OIL, OR WATER COMPANY THREATENED TO SHUT OFF SERVICE IN YOUR HOME?: NO
WITHIN THE LAST YEAR, HAVE YOU BEEN HUMILIATED OR EMOTIONALLY ABUSED IN OTHER WAYS BY YOUR PARTNER OR EX-PARTNER?: NO
WITHIN THE LAST YEAR, HAVE YOU BEEN KICKED, HIT, SLAPPED, OR OTHERWISE PHYSICALLY HURT BY YOUR PARTNER OR EX-PARTNER?: NO
WITHIN THE LAST YEAR, HAVE YOU BEEN AFRAID OF YOUR PARTNER OR EX-PARTNER?: NO
WITHIN THE PAST 12 MONTHS, YOU WORRIED THAT YOUR FOOD WOULD RUN OUT BEFORE YOU GOT THE MONEY TO BUY MORE: NEVER TRUE
WITHIN THE PAST 12 MONTHS, THE FOOD YOU BOUGHT JUST DIDN'T LAST AND YOU DIDN'T HAVE MONEY TO GET MORE: NEVER TRUE

## 2025-01-18 ASSESSMENT — LIFESTYLE VARIABLES
TOTAL SCORE: 0
ALCOHOL_USE: NO
DOES PATIENT WANT TO STOP DRINKING: NO
HAVE YOU EVER FELT YOU SHOULD CUT DOWN ON YOUR DRINKING: NO
CONSUMPTION TOTAL: NEGATIVE
EVER FELT BAD OR GUILTY ABOUT YOUR DRINKING: NO
AVERAGE NUMBER OF DAYS PER WEEK YOU HAVE A DRINK CONTAINING ALCOHOL: 0
EVER HAD A DRINK FIRST THING IN THE MORNING TO STEADY YOUR NERVES TO GET RID OF A HANGOVER: NO
HAVE PEOPLE ANNOYED YOU BY CRITICIZING YOUR DRINKING: NO
SUBSTANCE_ABUSE: 0
TOTAL SCORE: 0
TOTAL SCORE: 0
ON A TYPICAL DAY WHEN YOU DRINK ALCOHOL HOW MANY DRINKS DO YOU HAVE: 0
HOW MANY TIMES IN THE PAST YEAR HAVE YOU HAD 5 OR MORE DRINKS IN A DAY: 0

## 2025-01-18 ASSESSMENT — COGNITIVE AND FUNCTIONAL STATUS - GENERAL
SUGGESTED CMS G CODE MODIFIER MOBILITY: CH
SUGGESTED CMS G CODE MODIFIER DAILY ACTIVITY: CH
DAILY ACTIVITIY SCORE: 24
MOBILITY SCORE: 24

## 2025-01-18 NOTE — CONSULTS
DATE OF CONSULTATION:  1/18/2025     REFERRING PHYSICIAN:   Carmina Ayala M.D.     CONSULTING PHYSICIAN:  Kamar Mckeon M.D.     REASON FOR CONSULTATION:  I have been asked by Dr. Ayala to see the patient in surgical consultation for evaluation of small bowel obstruction.    HISTORY OF PRESENT ILLNESS: The patient is a 44 year-old  woman who presents to the Emergency Department with a one- day history of severe upper midline abdominal pain. The pain is associated with nausea, vomiting, and diarrhea. She The patient denies any recent or intercurrent illness. The patient has undergone laparoscopic cholecystectomy. The patient denies any previous surgery for obstructive symptoms.    PAST MEDICAL HISTORY:  has a past medical history of Cervical cancer (HCC) (2018).    She has no past medical history of Diabetes (HCC) or Hypertension.    PAST SURGICAL HISTORY:  has a past surgical history that includes lorena by laparoscopy (12/15/2024).    ALLERGIES:   Allergies   Allergen Reactions    Lactose Diarrhea     Pt reports intolerance to all milk products       CURRENT MEDICATIONS:    Home Medications       Reviewed by Maddy Marks R.N. (Registered Nurse) on 01/17/25 at 2128  Med List Status: Not Addressed     Medication Last Dose Status   acetaminophen (TYLENOL) 500 MG Tab  Active   ondansetron (ZOFRAN ODT) 4 MG TABLET DISPERSIBLE  Active                  Audit from Redirected Encounters    **Home medications have not yet been reviewed for this encounter**         FAMILY HISTORY: family history includes Hypertension in her mother.    SOCIAL HISTORY:  reports that she has never smoked. She has never used smokeless tobacco. She reports that she does not drink alcohol and does not use drugs.    REVIEW OF SYSTEMS: Comprehensive review of systems is negative with the exception of the aforementioned HPI, PMH, and PSH bullets in accordance with CMS guidelines.    PHYSICAL EXAMINATION:      Constitutional:     Vital  "Signs: BP (!) 136/90   Pulse 94   Temp 36.1 °C (96.9 °F) (Temporal)   Resp 14   Ht 1.626 m (5' 4\")   Wt 58.9 kg (129 lb 13.6 oz)   SpO2 94%    General Appearance: alert in no acute distress.   HEENT:    Demonstrates symmetric, reactive pupils. Extraocular muscles   are intact. Nares and oropharynx are clear.   Neck:    Supple. No adenopathy.  Respiratory:   Inspection: Unlabored respirations, no intercostal retractions, paradoxical motion, or accessory muscle use.   Auscultation: normal.  Cardiovascular:   Inspection: The skin is warm and well purfused.  Auscultation: Regular rate and rhythm.   Peripheral Pulses: Normal.   Abdomen:  Inspection: Abdominal inspection reveals no abrasions, contusions, lacerations or penetrating wounds.   Palpation: Palpation is remarkable for no significant tenderness, guarding, or peritoneal findings. No abdominal wall hernias.  Extremities:   Examination of the upper and lower extremities demonstrates no cyanosis edema or clubbing.  Neurologic:   Alert & oriented to person, time and place. Normal motor function. Normal sensory function. No focal deficits noted.  Psychiatric:   does not appear depressed or anxious, oriented to time, place, person, short and long term memory appears intact.    LABORATORY VALUES:   Recent Labs     01/17/25 2137   WBC 6.3   RBC 5.31   HEMOGLOBIN 14.9   HEMATOCRIT 44.2   MCV 83.2   MCH 28.1   MCHC 33.7   RDW 40.1   PLATELETCT 317   MPV 11.1     Recent Labs     01/17/25 2137   SODIUM 141   POTASSIUM 3.9   CHLORIDE 100   CO2 26   GLUCOSE 129*   BUN 11   CREATININE 0.67   CALCIUM 10.3     Recent Labs     01/17/25  2137   ASTSGOT 22   ALTSGPT 13   TBILIRUBIN <0.2   ALKPHOSPHAT 98   GLOBULIN 3.7*            IMAGING:   CT-ABDOMEN-PELVIS WITH   Final Result         1.  Fluid-filled prominence of small bowel with possible lower abdominal transition point, appearance concerning for partial or evolving obstructive changes.      DX-SMALL BOWEL SERIES    " (Results Pending)       ASSESSMENT AND PLAN:     44 year-old woman with a history of a recent laparoscopic cholecystectomy presenting with progressive upper abdominal pain associated with nausea, vomiting, and diarrhea. Non-tender on exam. Labs without  leukocytosis or acidosis. CT scan with fluid-filled small bowel without clear transition point. Favor gastroenteritis over obstruction. Outpatient observation with small bowel follow through.      The patient has radiographic findings of partial small bowel obstruction without generalized peritonitis, evidence of clinical deterioration, or radiographic findings consistent with bowel ischemia. Nonoperative management, serial abdominal examination, and water-soluble contrast imaging.     DISPOSITION: Outpatient Observation to Clinical Decision Making Unit (CDU). [facility] Blue Service will follow.       ____________________________________     Kamar Mckeon M.D.    DD: 1/18/2025  2:36 AM    AAST Grading System for EGS Conditions  ACS NSQIP Surgical Risk Calculator

## 2025-01-18 NOTE — ED NOTES
Taken patient from triage waiting room by Er tech ,via wheelchair, alert/ oriented x 4.Verified patient identification.  Assumed patient care.   Placed on patient room. Changed clothes to hospital gown. Connected to cardiac monitor.   Given the call light and instructed to call for any assistance needed/ or concerns.   Bed on lowest position, side rails up, breaks locked. Awaiting for ERP.

## 2025-01-18 NOTE — ED PROVIDER NOTES
ED Provider Note    CHIEF COMPLAINT  Chief Complaint   Patient presents with    Nausea/Vomiting/Diarrhea     Since this AM    Abdominal Pain     Pain in upper abdomen since this morning, getting progressively worse; hx of cholecystectomy on 12/15        EXTERNAL RECORDS REVIEWED  Patient was admitted to the hospital from the 2 to 4 December for small bowel obstruction.  She had a cholecystectomy on 15 December.  CT abdomen and pelvis showed multiple dilated loops of fluid-filled small bowel measuring up to 4.1 cm in diameter .  General surgery consulted recommended nonop management with NG tube.    HPI/ROS  LIMITATION TO HISTORY   Select: Language Polish,  Used   OUTSIDE HISTORIAN(S):  Friend at bedside    Katina Rider is a 44 y.o. female who presents with nausea, vomiting and abdominal pain.  She says that she woke up this morning with pain in her upper abdomen.  She says it has been worsening throughout the day today.  She has had multiple episodes of dry heaving and vomiting and she feels nauseated.  She has been having bowel movements and in fact she had diarrhea this morning.  Last bowel movement was at 8 PM.  She says the pain radiates all throughout her abdomen is mostly epigastric.    PAST MEDICAL HISTORY   has a past medical history of Cervical cancer (HCC) (2018).    SURGICAL HISTORY   has a past surgical history that includes lorena by laparoscopy (12/15/2024).    FAMILY HISTORY  Family History   Problem Relation Age of Onset    Hypertension Mother        SOCIAL HISTORY  Social History     Tobacco Use    Smoking status: Never    Smokeless tobacco: Never   Vaping Use    Vaping status: Never Used   Substance and Sexual Activity    Alcohol use: Never    Drug use: Never    Sexual activity: Not on file       CURRENT MEDICATIONS  Home Medications       Reviewed by Maddy Marks R.N. (Registered Nurse) on 01/17/25 at 4985  Med List Status: Not Addressed     Medication Last Dose Status  "  acetaminophen (TYLENOL) 500 MG Tab  Active   ondansetron (ZOFRAN ODT) 4 MG TABLET DISPERSIBLE  Active                  Audit from Redirected Encounters    **Home medications have not yet been reviewed for this encounter**         ALLERGIES  Allergies   Allergen Reactions    Lactose Diarrhea     Pt reports intolerance to all milk products       PHYSICAL EXAM  VITAL SIGNS: BP (!) 136/90   Pulse 94   Temp 36.1 °C (96.9 °F) (Temporal)   Resp 14   Ht 1.626 m (5' 4\")   Wt 58.9 kg (129 lb 13.6 oz)   SpO2 94%   BMI 22.29 kg/m²    Constitutional: Awake and alert Non toxic  HENT: Normal inspection  Moist mucous membranes  Eyes: Normal inspection  Neck: Grossly normal range of motion.  Cardiovascular: Normal heart rate, Normal rhythm.    Thorax & Lungs: No respiratory distress  Abdomen: Soft, mildly distended, she has mild generalized tenderness, no mass  Skin: No obvious rash.  Extremities: Warm, well perfused. No clubbing, cyanosis, edema   Neurologic: Grossly normal   Psychiatric: Normal for situation      EKG/LABS  Results for orders placed or performed during the hospital encounter of 01/17/25   CBC WITH DIFFERENTIAL    Collection Time: 01/17/25  9:37 PM   Result Value Ref Range    WBC 6.3 4.8 - 10.8 K/uL    RBC 5.31 4.20 - 5.40 M/uL    Hemoglobin 14.9 12.0 - 16.0 g/dL    Hematocrit 44.2 37.0 - 47.0 %    MCV 83.2 81.4 - 97.8 fL    MCH 28.1 27.0 - 33.0 pg    MCHC 33.7 32.2 - 35.5 g/dL    RDW 40.1 35.9 - 50.0 fL    Platelet Count 317 164 - 446 K/uL    MPV 11.1 9.0 - 12.9 fL    Neutrophils-Polys 77.40 (H) 44.00 - 72.00 %    Lymphocytes 16.60 (L) 22.00 - 41.00 %    Monocytes 4.20 0.00 - 13.40 %    Eosinophils 1.30 0.00 - 6.90 %    Basophils 0.30 0.00 - 1.80 %    Immature Granulocytes 0.20 0.00 - 0.90 %    Nucleated RBC 0.00 0.00 - 0.20 /100 WBC    Neutrophils (Absolute) 4.85 1.82 - 7.42 K/uL    Lymphs (Absolute) 1.04 1.00 - 4.80 K/uL    Monos (Absolute) 0.26 0.00 - 0.85 K/uL    Eos (Absolute) 0.08 0.00 - 0.51 K/uL    " Baso (Absolute) 0.02 0.00 - 0.12 K/uL    Immature Granulocytes (abs) 0.01 0.00 - 0.11 K/uL    NRBC (Absolute) 0.00 K/uL   COMP METABOLIC PANEL    Collection Time: 01/17/25  9:37 PM   Result Value Ref Range    Sodium 141 135 - 145 mmol/L    Potassium 3.9 3.6 - 5.5 mmol/L    Chloride 100 96 - 112 mmol/L    Co2 26 20 - 33 mmol/L    Anion Gap 15.0 7.0 - 16.0    Glucose 129 (H) 65 - 99 mg/dL    Bun 11 8 - 22 mg/dL    Creatinine 0.67 0.50 - 1.40 mg/dL    Calcium 10.3 8.5 - 10.5 mg/dL    Correct Calcium 9.7 8.5 - 10.5 mg/dL    AST(SGOT) 22 12 - 45 U/L    ALT(SGPT) 13 2 - 50 U/L    Alkaline Phosphatase 98 30 - 99 U/L    Total Bilirubin <0.2 0.1 - 1.5 mg/dL    Albumin 4.7 3.2 - 4.9 g/dL    Total Protein 8.4 (H) 6.0 - 8.2 g/dL    Globulin 3.7 (H) 1.9 - 3.5 g/dL    A-G Ratio 1.3 g/dL   LIPASE    Collection Time: 01/17/25  9:37 PM   Result Value Ref Range    Lipase 21 11 - 82 U/L   HCG QUAL SERUM    Collection Time: 01/17/25  9:37 PM   Result Value Ref Range    Beta-Hcg Qualitative Serum Negative Negative   ESTIMATED GFR    Collection Time: 01/17/25  9:37 PM   Result Value Ref Range    GFR (CKD-EPI) 110 >60 mL/min/1.73 m 2         RADIOLOGY/PROCEDURES   I have independently interpreted the diagnostic imaging associated with this visit and am waiting the final reading from the radiologist.   My preliminary interpretation is as follows: + SBO    Radiologist interpretation:  CT-ABDOMEN-PELVIS WITH   Final Result         1.  Fluid-filled prominence of small bowel with possible lower abdominal transition point, appearance concerning for partial or evolving obstructive changes.      DX-SMALL BOWEL SERIES    (Results Pending)       COURSE & MEDICAL DECISION MAKING    ASSESSMENT, COURSE AND PLAN  Care Narrative: This is a 44-year-old, Indonesian-speaking lady who presents with epigastric pain, vomiting and diarrhea.  On arrival she has normal vital signs.  She has a benign abdominal exam without rebound, guarding or signs of peritonitis.   She is hypertensive but otherwise vital stable.  Labs are reassuring, no leukocytosis, no significant electrolyte derangements.  Her lipase is normal and she does not have pancreatitis.  She is not pregnant.  CT does show fluid-filled prominence of the small bowel with possible lower abdominal transition point which is concerning for a partial or evolving obstruction.  Patient symptoms have been controlled in the ER with Reglan, morphine and Zofran.      2:52 AM  Dr. Mckeon, General Surgery consulted.  He recommended nonop management with an NG tube.    I discussed with the hospitalist, Dr. Huffman who accepted the patient for admission.      Hydration: Based on the patient's presentation of Acute Vomiting the patient was given IV fluids. IV Hydration was used because oral hydration was not adequate alone. Upon recheck following hydration, the patient was improved.        DISPOSITION AND DISCUSSIONS  I have discussed management of the patient with the following physicians and ISRAEL's:  see above    Discussion of management with other QHP or appropriate source(s): None      FINAL DIAGNOSIS  1. Small bowel obstruction (HCC) Acute        Electronically signed by: Carmina Ayala M.D., 1/17/2025 10:57 PM

## 2025-01-18 NOTE — CARE PLAN
Problem: Knowledge Deficit - Standard  Goal: Patient and family/care givers will demonstrate understanding of plan of care, disease process/condition, diagnostic tests and medications  Outcome: Progressing   The patient is Stable - Low risk of patient condition declining or worsening    Shift Goals  Clinical Goals: Advanced diet  Patient Goals: discharge home    Progress made toward(s) clinical / shift goals: Patient tolerating clears.    Patient is not progressing towards the following goals:

## 2025-01-18 NOTE — ASSESSMENT & PLAN NOTE
44-year-old female with history of follow-up cholecystectomy and more recent admission 2 weeks ago with small bowel obstruction treated conservatively, presented with recurrence of nausea vomiting and abdominal pain  CT of the abdomen is concerning for evolving small bowel obstruction  Plan: NG tube to low suction  Bowel rest  Symptomatic treatment with Zofran and morphine as needed  IV fluid support  Small bowel series

## 2025-01-18 NOTE — ED TRIAGE NOTES
Chief Complaint   Patient presents with    Nausea/Vomiting/Diarrhea     Since this AM    Abdominal Pain     Pain in upper abdomen since this morning, getting progressively worse; hx of cholecystectomy on 12/15      Pt to triage via wheelchair with friend for above complaint. Pt states she was admitted here on 1/2 for this same pain s/p cholecystectomy. Pt states she took 2 Tylenol tonight at 2000 without relief. Abdominal pain protocol ordered.     Pt is alert/oriented and follows commands. Pt speaking in full sentences and responds appropriately to questions. No acute distress noted in triage and respirations are even and unlabored.     Pt placed in phlebotomy area and educated on triage process. Pt and friend encouraged to alert staff for any changes in condition.

## 2025-01-18 NOTE — PROGRESS NOTES
"  DATE: 1/18/2025    Surgical consultation for small bowel obstruction.    1/18 Negative small bowel series.    INTERVAL EVENTS:    Abdomen soft and non-tender. Multiple BM's status post oral contrast.   SBFT with no obstruction.    REVIEW OF SYSTEMS:  Comprehensive review of systems is negative with the exception of the aforementioned HPI, PMH, and PSH bullets in accordance with CMS guidelines.    PHYSICAL EXAMINATION:  Vital Signs: /88   Pulse 81   Temp 36.6 °C (97.9 °F) (Temporal)   Resp 16   Ht 1.626 m (5' 4\")   Wt 58.9 kg (129 lb 13.6 oz)   SpO2 96%   Physical Exam  Vitals and nursing note reviewed.   Constitutional:       General: She is not in acute distress.     Appearance: She is not ill-appearing, toxic-appearing or diaphoretic.   HENT:      Head: Normocephalic.   Cardiovascular:      Rate and Rhythm: Normal rate.      Pulses: Normal pulses.   Pulmonary:      Effort: No respiratory distress.   Chest:      Chest wall: No tenderness.   Abdominal:      General: There is no distension.      Tenderness: There is no abdominal tenderness. There is no guarding or rebound.         LABORATORY VALUES:  Recent Labs     01/17/25  2137   WBC 6.3   RBC 5.31   HEMOGLOBIN 14.9   HEMATOCRIT 44.2   MCV 83.2   MCH 28.1   MCHC 33.7   RDW 40.1   PLATELETCT 317   MPV 11.1     Recent Labs     01/17/25  2137   SODIUM 141   POTASSIUM 3.9   CHLORIDE 100   CO2 26   GLUCOSE 129*   BUN 11   CREATININE 0.67   CALCIUM 10.3     Recent Labs     01/17/25  2137   ASTSGOT 22   ALTSGPT 13   TBILIRUBIN <0.2   ALKPHOSPHAT 98   GLOBULIN 3.7*           IMAGING:  DX-SMALL BOWEL SERIES   Final Result         1.  Contrast within small bowel visualized within the colon at 2 hours extending to the rectum.      CT-ABDOMEN-PELVIS WITH   Final Result         1.  Fluid-filled prominence of small bowel with possible lower abdominal transition point, appearance concerning for partial or evolving obstructive changes.          ASSESSMENT AND " PLAN:    Benign abdominal exam.  SBFT negative for obstruction. No indication for acute surgical intervention. ACS Blue to sign off.  Please reconsult should the need arise.  Thank you for allowing us to participate in this patients care.       ____________________________________     ANAND Martinez    DD: 1/18/2025  9:44 AM

## 2025-01-18 NOTE — PROGRESS NOTES
4 Eyes Skin Assessment Completed by DIAMOND Wei and DIAMOND Sepulveda.    Head WDL  Ears WDL  Nose WDL  Mouth WDL  Neck WDL  Breast/Chest WDL  Shoulder Blades WDL  Spine WDL  (R) Arm/Elbow/Hand WDL  (L) Arm/Elbow/Hand WDL  Abdomen scars from lap lorena  Groin WDL  Scrotum/Coccyx/Buttocks WDL  (R) Leg WDL  (L) Leg WDL  (R) Heel/Foot/Toe WDL  (L) Heel/Foot/Toe WDL          Devices In Places NA      Interventions In Place N/A    Possible Skin Injury No    Pictures Uploaded Into Epic N/A  Wound Consult Placed N/A  RN Wound Prevention Protocol Ordered No

## 2025-01-18 NOTE — PROGRESS NOTES
Pt arrived to unit via gurney. Pt able to ambulate to bed with steady gait. Pt up to the bathroom had a BM. Attempted NG tube placement. Pt did not tolerate asking RN to remove tube as it was very uncomfortable. Educated pt about the need for intervention. Pt continued to refuse.

## 2025-01-18 NOTE — H&P
Hospital Medicine History & Physical Note    Date of Service  1/18/2025    Primary Care Physician  Pcp Pt States None    Consultants  General Surgery, Dr. Mckeon    Code Status  Full Code    Chief Complaint  Chief Complaint   Patient presents with    Nausea/Vomiting/Diarrhea     Since this AM    Abdominal Pain     Pain in upper abdomen since this morning, getting progressively worse; hx of cholecystectomy on 12/15        History of Presenting Illness  Katina Rider is a 44 y.o. female with history of laparoscopic cholecystectomy in December 2024, small bowel obstruction treated conservatively (1/2 to 1/4) who presented 1/17/2025 with recurrent episode of nausea vomiting and abdominal pain in the lateral abdomen since she woke up in the morning, and has been worsening as result of the day.  She has been having loose bowel movements.  CT of the abdomen and pelvis with contrast showed possible evolving obstruction  Patient continued having vomiting in ER  General surgery/Dr. Mckeon consulted and recommended NG tube placement small bowel series.    I discussed the plan of care with patient, bedside RN, and ERP .    Review of Systems  Review of Systems   Constitutional:  Negative for chills, fever and weight loss.   HENT:  Negative for ear pain, hearing loss and tinnitus.    Eyes:  Negative for blurred vision, double vision and photophobia.   Respiratory:  Negative for cough, hemoptysis and sputum production.    Cardiovascular:  Negative for chest pain, palpitations and orthopnea.   Gastrointestinal:  Positive for abdominal pain, diarrhea, nausea and vomiting. Negative for blood in stool, constipation and heartburn.   Genitourinary:  Negative for dysuria, flank pain, frequency and hematuria.   Musculoskeletal:  Negative for back pain, joint pain and neck pain.   Skin:  Negative for itching and rash.   Neurological:  Negative for tremors, speech change, focal weakness and headaches.   Endo/Heme/Allergies:  Negative  for environmental allergies and polydipsia. Does not bruise/bleed easily.   Psychiatric/Behavioral:  Negative for hallucinations and substance abuse. The patient is not nervous/anxious.        Past Medical History   has a past medical history of Cervical cancer (HCC) (2018).    Surgical History   has a past surgical history that includes lorena by laparoscopy (12/15/2024).     Family History  family history includes Hypertension in her mother.   Family history reviewed with patient. There is no family history that is pertinent to the chief complaint.     Social History   reports that she has never smoked. She has never used smokeless tobacco. She reports that she does not drink alcohol and does not use drugs.    Allergies  Allergies   Allergen Reactions    Lactose Diarrhea     Pt reports intolerance to all milk products       Medications  Prior to Admission Medications   Prescriptions Last Dose Informant Patient Reported? Taking?   acetaminophen (TYLENOL) 500 MG Tab  Patient No No   Sig: Take 1-2 Tablets by mouth every 6 hours as needed for Moderate Pain or Mild Pain.   ondansetron (ZOFRAN ODT) 4 MG TABLET DISPERSIBLE  Patient No No   Sig: Take 1 Tablet by mouth every 6 hours as needed for Nausea/Vomiting.      Facility-Administered Medications: None       Physical Exam  Temp:  [36.1 °C (96.9 °F)] 36.1 °C (96.9 °F)  Pulse:  [94] 94  Resp:  [14] 14  BP: (136-140)/(90-98) 136/90  SpO2:  [94 %-96 %] 94 %  Blood Pressure: (!) 136/90   Temperature: 36.1 °C (96.9 °F)   Pulse: 94   Respiration: 14   Pulse Oximetry: 94 %       Physical Exam  Vitals and nursing note reviewed.   Constitutional:       General: She is not in acute distress.     Appearance: Normal appearance.   HENT:      Head: Normocephalic and atraumatic.      Nose: Nose normal.      Mouth/Throat:      Mouth: Mucous membranes are moist.   Eyes:      Extraocular Movements: Extraocular movements intact.      Pupils: Pupils are equal, round, and reactive to light.  "  Cardiovascular:      Rate and Rhythm: Normal rate and regular rhythm.   Pulmonary:      Effort: Pulmonary effort is normal.      Breath sounds: Normal breath sounds.   Abdominal:      General: Abdomen is flat. There is distension.      Tenderness: There is generalized abdominal tenderness. There is no guarding or rebound.   Musculoskeletal:         General: No swelling or deformity. Normal range of motion.      Cervical back: Normal range of motion and neck supple.   Skin:     General: Skin is warm and dry.   Neurological:      General: No focal deficit present.      Mental Status: She is alert and oriented to person, place, and time.   Psychiatric:         Mood and Affect: Mood normal.         Behavior: Behavior normal.         Laboratory:  Recent Labs     01/17/25 2137   WBC 6.3   RBC 5.31   HEMOGLOBIN 14.9   HEMATOCRIT 44.2   MCV 83.2   MCH 28.1   MCHC 33.7   RDW 40.1   PLATELETCT 317   MPV 11.1     Recent Labs     01/17/25 2137   SODIUM 141   POTASSIUM 3.9   CHLORIDE 100   CO2 26   GLUCOSE 129*   BUN 11   CREATININE 0.67   CALCIUM 10.3     Recent Labs     01/17/25 2137   ALTSGPT 13   ASTSGOT 22   ALKPHOSPHAT 98   TBILIRUBIN <0.2   LIPASE 21   GLUCOSE 129*         No results for input(s): \"NTPROBNP\" in the last 72 hours.      No results for input(s): \"TROPONINT\" in the last 72 hours.    Imaging:  CT-ABDOMEN-PELVIS WITH   Final Result         1.  Fluid-filled prominence of small bowel with possible lower abdominal transition point, appearance concerning for partial or evolving obstructive changes.      DX-SMALL BOWEL SERIES    (Results Pending)           Assessment/Plan:  Justification for Admission Status  I anticipate this patient will require at least two midnights for appropriate medical management, necessitating inpatient admission because small bowel obstruction    Patient will need a Med/Surg bed on MEDICAL service .  The need is secondary to small bowel obstruction.    * SBO (small bowel obstruction) " (HCC)- (present on admission)  Assessment & Plan  44-year-old female with history of follow-up cholecystectomy and more recent admission 2 weeks ago with small bowel obstruction treated conservatively, presented with recurrence of nausea vomiting and abdominal pain  CT of the abdomen is concerning for evolving small bowel obstruction  Plan: NG tube to low suction  Bowel rest  Symptomatic treatment with Zofran and morphine as needed  IV fluid support  Small bowel series      Hyperglycemia  Assessment & Plan  Glucose 129  Monitor, consider insulin sliding scale        VTE prophylaxis: enoxaparin ppx

## 2025-01-18 NOTE — CARE PLAN
The patient is Stable - Low risk of patient condition declining or worsening    Shift Goals  Clinical Goals: NG tube placement  Patient Goals: 0/10 pain, no nausea    Progress made toward(s) clinical / shift goals:    Problem: Bowel Elimination  Goal: Establish and maintain regular bowel function  Outcome: Not Met       Patient is not progressing towards the following goals: pt not tolerating NG tube insertion       Problem: Bowel Elimination  Goal: Establish and maintain regular bowel function  Outcome: Not Met

## 2025-01-18 NOTE — DISCHARGE SUMMARY
Discharge Summary    CHIEF COMPLAINT ON ADMISSION  Chief Complaint   Patient presents with    Nausea/Vomiting/Diarrhea     Since this AM    Abdominal Pain     Pain in upper abdomen since this morning, getting progressively worse; hx of cholecystectomy on 12/15        Reason for Admission  Post-Op Pain; N/V     Admission Date  1/17/2025    CODE STATUS  Full Code    HPI & HOSPITAL COURSE  44 y.o. female with history of laparoscopic cholecystectomy in December 2024, small bowel obstruction treated conservatively (1/2 to 1/4) who presented 1/17/2025 with recurrent episode of nausea vomiting and abdominal pain in the lateral abdomen since she woke up in the morning, and has been worsening as result of the day.  She has been having loose bowel movements.  CT of the abdomen and pelvis with contrast showed possible evolving obstruction  Patient continued having vomiting in ER  General surgery/Dr. Mckeon consulted and recommended NG tube placement small bowel series.  Patient was treated with IV fluid hydration and bowel rest.  The next day her symptoms improved.  She underwent a small bowel series that revealed no evidence of obstruction.  She was able to tolerate a diet without any nausea, vomiting or abdominal pain.  She will be discharged home with close outpatient follow-up with her PCP    Therefore, she is discharged in good and stable condition to home with close outpatient follow-up.    The patient met 2-midnight criteria for an inpatient stay at the time of discharge.    Discharge Date  1/18/25    FOLLOW UP ITEMS POST DISCHARGE  PCP    DISCHARGE DIAGNOSES  Principal Problem (Resolved):    SBO (small bowel obstruction) (HCC) (POA: Yes)  Active Problems:    * No active hospital problems. *  Resolved Problems:    Hyperglycemia (POA: Unknown)      FOLLOW UP  No future appointments.  No follow-up provider specified.    MEDICATIONS ON DISCHARGE     Medication List        START taking these medications        Instructions    famotidine 20 MG Tabs  Commonly known as: Pepcid   Take 1 Tablet by mouth 2 times a day.  Dose: 20 mg     oxyCODONE immediate-release 5 MG Tabs  Commonly known as: Roxicodone   Take 1 Tablet by mouth every 6 hours as needed for Severe Pain for up to 5 days.  Dose: 5 mg            CHANGE how you take these medications        Instructions   * ondansetron 4 MG Tbdp  What changed: Another medication with the same name was added. Make sure you understand how and when to take each.  Commonly known as: Zofran ODT   Take 1 Tablet by mouth every 6 hours as needed for Nausea/Vomiting.  Dose: 4 mg     * ondansetron 4 MG Tbdp  What changed: You were already taking a medication with the same name, and this prescription was added. Make sure you understand how and when to take each.  Commonly known as: Zofran ODT   Take 1 Tablet by mouth every 6 hours as needed for Nausea/Vomiting.  Dose: 4 mg           * This list has 2 medication(s) that are the same as other medications prescribed for you. Read the directions carefully, and ask your doctor or other care provider to review them with you.                CONTINUE taking these medications        Instructions   acetaminophen 500 MG Tabs  Commonly known as: Tylenol   Take 1-2 Tablets by mouth every 6 hours as needed for Moderate Pain or Mild Pain.  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  Weight bearing as tolerated    CONSULTATIONS  Surgery    PROCEDURES  none    LABORATORY  Lab Results   Component Value Date    SODIUM 141 01/17/2025    POTASSIUM 3.9 01/17/2025    CHLORIDE 100 01/17/2025    CO2 26 01/17/2025    GLUCOSE 129 (H)  01/17/2025    BUN 11 01/17/2025    CREATININE 0.67 01/17/2025        Lab Results   Component Value Date    WBC 6.3 01/17/2025    HEMOGLOBIN 14.9 01/17/2025    HEMATOCRIT 44.2 01/17/2025    PLATELETCT 317 01/17/2025        Total time of the discharge process exceeds 32 minutes.

## 2025-01-19 NOTE — PROGRESS NOTES
Discharge orders received.  Patient arrived to the discharge lounge.  PIV removed.  Instructions given, medications reviewed and general discharge education provided to patient. Used  services [id number]. Follow up appointments discussed.  Patient verbalized understanding of dc instructions and prescriptions.  Patient signed discharge instructions.  Patient verbalized understanding had all belongings with her.  Patient received meds to beds and pending ride. Wished patient a speedy recovery.

## (undated) DEVICE — PACK LAP CHOLE OR - (2EA/CA)

## (undated) DEVICE — TROCAR Z THREAD12MM OPTICAL - NON BLADED (6/BX)

## (undated) DEVICE — ELECTRODE DUAL RETURN W/ CORD - (50/PK)

## (undated) DEVICE — CANNULA W/SEAL 5X100 Z-THRE - ADED KII (12/BX)

## (undated) DEVICE — SET EXTENSION WITH 2 PORTS (48EA/CA) ***PART #2C8610 IS A SUBSTITUTE*****

## (undated) DEVICE — CANISTER SUCTION 3000ML MECHANICAL FILTER AUTO SHUTOFF MEDI-VAC NONSTERILE LF DISP (40EA/CA)

## (undated) DEVICE — TROCAR 5X100 NON BLADED Z-TH - READ KII (6/BX)

## (undated) DEVICE — SODIUM CHL IRRIGATION 0.9% 1000ML (12EA/CA)

## (undated) DEVICE — SET LEADWIRE 5 LEAD BEDSIDE DISPOSABLE ECG (1SET OF 5/EA)

## (undated) DEVICE — CLIP MED LG INTNL HRZN TI ESCP - (20/BX)

## (undated) DEVICE — BAG RETRIEVAL 10ML (10EA/BX)

## (undated) DEVICE — COVER LIGHT HANDLE ALC PLUS DISP (18EA/BX)

## (undated) DEVICE — DERMABOND ADVANCED - (12EA/BX)

## (undated) DEVICE — CHLORAPREP 26 ML APPLICATOR - ORANGE TINT(25/CA)

## (undated) DEVICE — SENSOR OXIMETER ADULT SPO2 RD SET (20EA/BX)

## (undated) DEVICE — GOWN WARMING STANDARD FLEX - (30/CA)

## (undated) DEVICE — LACTATED RINGERS INJ 1000 ML - (14EA/CA 60CA/PF)

## (undated) DEVICE — SUTURE 0 VICRYL PLUS UR-6 - 27 INCH (36/BX)

## (undated) DEVICE — GLOVE BIOGEL SZ 7.5 SURGICAL PF LTX - (50PR/BX 4BX/CA)

## (undated) DEVICE — TUBING CLEARLINK DUO-VENT - C-FLO (48EA/CA)

## (undated) DEVICE — GLOVE BIOGEL INDICATOR SZ 8 SURGICAL PF LTX - (50/BX 4BX/CA)

## (undated) DEVICE — SET SUCTION/IRRIGATION WITH DISPOSABLE TIP (6/CA )PART #0250-070-520 IS A SUB

## (undated) DEVICE — SUTURE GENERAL

## (undated) DEVICE — SUTURE 4-0 MONOCRYL PLUS PS-2 - 27 INCH (36/BX)